# Patient Record
Sex: FEMALE | Race: WHITE | HISPANIC OR LATINO | URBAN - METROPOLITAN AREA
[De-identification: names, ages, dates, MRNs, and addresses within clinical notes are randomized per-mention and may not be internally consistent; named-entity substitution may affect disease eponyms.]

---

## 2017-06-27 PROBLEM — Z00.00 ENCOUNTER FOR PREVENTIVE HEALTH EXAMINATION: Status: ACTIVE | Noted: 2017-06-27

## 2017-09-15 ENCOUNTER — EMERGENCY (EMERGENCY)
Facility: HOSPITAL | Age: 42
LOS: 1 days | Discharge: HOME | End: 2017-09-15
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
## 2017-09-15 DIAGNOSIS — M25.572 PAIN IN LEFT ANKLE AND JOINTS OF LEFT FOOT: ICD-10-CM

## 2017-09-15 DIAGNOSIS — S93.401A SPRAIN OF UNSPECIFIED LIGAMENT OF RIGHT ANKLE, INITIAL ENCOUNTER: ICD-10-CM

## 2017-09-15 DIAGNOSIS — E11.9 TYPE 2 DIABETES MELLITUS WITHOUT COMPLICATIONS: ICD-10-CM

## 2017-09-15 DIAGNOSIS — S82.002A UNSPECIFIED FRACTURE OF LEFT PATELLA, INITIAL ENCOUNTER FOR CLOSED FRACTURE: ICD-10-CM
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
## 2018-03-24 ENCOUNTER — OUTPATIENT (OUTPATIENT)
Dept: OUTPATIENT SERVICES | Facility: HOSPITAL | Age: 43
LOS: 1 days | Discharge: HOME | End: 2018-03-24

## 2018-03-24 DIAGNOSIS — E55.9 VITAMIN D DEFICIENCY, UNSPECIFIED: ICD-10-CM

## 2018-03-24 DIAGNOSIS — Z00.00 ENCOUNTER FOR GENERAL ADULT MEDICAL EXAMINATION WITHOUT ABNORMAL FINDINGS: ICD-10-CM

## 2018-03-24 DIAGNOSIS — Z13.21 ENCOUNTER FOR SCREENING FOR NUTRITIONAL DISORDER: ICD-10-CM

## 2018-03-24 DIAGNOSIS — E11.9 TYPE 2 DIABETES MELLITUS WITHOUT COMPLICATIONS: ICD-10-CM

## 2018-03-24 DIAGNOSIS — Z20.89 CONTACT WITH AND (SUSPECTED) EXPOSURE TO OTHER COMMUNICABLE DISEASES: ICD-10-CM

## 2018-03-24 DIAGNOSIS — Z13.220 ENCOUNTER FOR SCREENING FOR LIPOID DISORDERS: ICD-10-CM

## 2018-03-24 DIAGNOSIS — Z11.3 ENCOUNTER FOR SCREENING FOR INFECTIONS WITH A PREDOMINANTLY SEXUAL MODE OF TRANSMISSION: ICD-10-CM

## 2019-02-07 ENCOUNTER — OUTPATIENT (OUTPATIENT)
Dept: OUTPATIENT SERVICES | Facility: HOSPITAL | Age: 44
LOS: 1 days | Discharge: HOME | End: 2019-02-07

## 2019-02-07 DIAGNOSIS — R79.1 ABNORMAL COAGULATION PROFILE: ICD-10-CM

## 2019-02-07 DIAGNOSIS — Z13.220 ENCOUNTER FOR SCREENING FOR LIPOID DISORDERS: ICD-10-CM

## 2019-02-07 DIAGNOSIS — N39.0 URINARY TRACT INFECTION, SITE NOT SPECIFIED: ICD-10-CM

## 2019-02-07 DIAGNOSIS — E11.9 TYPE 2 DIABETES MELLITUS WITHOUT COMPLICATIONS: ICD-10-CM

## 2019-02-07 DIAGNOSIS — R73.09 OTHER ABNORMAL GLUCOSE: ICD-10-CM

## 2019-02-07 DIAGNOSIS — R97.0 ELEVATED CARCINOEMBRYONIC ANTIGEN [CEA]: ICD-10-CM

## 2019-02-07 DIAGNOSIS — R79.89 OTHER SPECIFIED ABNORMAL FINDINGS OF BLOOD CHEMISTRY: ICD-10-CM

## 2019-02-07 DIAGNOSIS — Z13.21 ENCOUNTER FOR SCREENING FOR NUTRITIONAL DISORDER: ICD-10-CM

## 2019-02-07 DIAGNOSIS — Z00.00 ENCOUNTER FOR GENERAL ADULT MEDICAL EXAMINATION WITHOUT ABNORMAL FINDINGS: ICD-10-CM

## 2019-02-07 DIAGNOSIS — Z13.0 ENCOUNTER FOR SCREENING FOR DISEASES OF THE BLOOD AND BLOOD-FORMING ORGANS AND CERTAIN DISORDERS INVOLVING THE IMMUNE MECHANISM: ICD-10-CM

## 2019-02-07 DIAGNOSIS — R94.6 ABNORMAL RESULTS OF THYROID FUNCTION STUDIES: ICD-10-CM

## 2019-04-23 ENCOUNTER — OUTPATIENT (OUTPATIENT)
Dept: OUTPATIENT SERVICES | Facility: HOSPITAL | Age: 44
LOS: 1 days | Discharge: HOME | End: 2019-04-23

## 2019-04-23 DIAGNOSIS — Z02.1 ENCOUNTER FOR PRE-EMPLOYMENT EXAMINATION: ICD-10-CM

## 2019-04-23 DIAGNOSIS — R76.11 NONSPECIFIC REACTION TO TUBERCULIN SKIN TEST WITHOUT ACTIVE TUBERCULOSIS: ICD-10-CM

## 2019-04-23 DIAGNOSIS — D64.9 ANEMIA, UNSPECIFIED: ICD-10-CM

## 2019-04-30 ENCOUNTER — OUTPATIENT (OUTPATIENT)
Dept: OUTPATIENT SERVICES | Facility: HOSPITAL | Age: 44
LOS: 1 days | Discharge: HOME | End: 2019-04-30

## 2019-04-30 DIAGNOSIS — R76.11 NONSPECIFIC REACTION TO TUBERCULIN SKIN TEST WITHOUT ACTIVE TUBERCULOSIS: ICD-10-CM

## 2020-11-13 ENCOUNTER — OUTPATIENT (OUTPATIENT)
Dept: OUTPATIENT SERVICES | Facility: HOSPITAL | Age: 45
LOS: 1 days | Discharge: HOME | End: 2020-11-13

## 2020-11-13 DIAGNOSIS — F31.81 BIPOLAR II DISORDER: ICD-10-CM

## 2020-11-19 ENCOUNTER — APPOINTMENT (OUTPATIENT)
Dept: PSYCHIATRY | Facility: CLINIC | Age: 45
End: 2020-11-19
Payer: MEDICARE

## 2020-11-19 ENCOUNTER — OUTPATIENT (OUTPATIENT)
Dept: OUTPATIENT SERVICES | Facility: HOSPITAL | Age: 45
LOS: 1 days | Discharge: HOME | End: 2020-11-19

## 2020-11-19 ENCOUNTER — NON-APPOINTMENT (OUTPATIENT)
Age: 45
End: 2020-11-19

## 2020-11-19 DIAGNOSIS — F31.81 BIPOLAR II DISORDER: ICD-10-CM

## 2020-11-19 PROCEDURE — 90792 PSYCH DIAG EVAL W/MED SRVCS: CPT | Mod: 95

## 2020-11-25 ENCOUNTER — APPOINTMENT (OUTPATIENT)
Dept: PSYCHIATRY | Facility: CLINIC | Age: 45
End: 2020-11-25

## 2020-11-25 ENCOUNTER — OUTPATIENT (OUTPATIENT)
Dept: OUTPATIENT SERVICES | Facility: HOSPITAL | Age: 45
LOS: 1 days | Discharge: HOME | End: 2020-11-25

## 2020-11-25 DIAGNOSIS — F31.81 BIPOLAR II DISORDER: ICD-10-CM

## 2020-12-07 ENCOUNTER — OUTPATIENT (OUTPATIENT)
Dept: OUTPATIENT SERVICES | Facility: HOSPITAL | Age: 45
LOS: 1 days | Discharge: HOME | End: 2020-12-07

## 2020-12-07 ENCOUNTER — APPOINTMENT (OUTPATIENT)
Dept: PSYCHIATRY | Facility: CLINIC | Age: 45
End: 2020-12-07

## 2020-12-07 DIAGNOSIS — F31.81 BIPOLAR II DISORDER: ICD-10-CM

## 2020-12-08 ENCOUNTER — NON-APPOINTMENT (OUTPATIENT)
Age: 45
End: 2020-12-08

## 2020-12-08 DIAGNOSIS — M19.90 UNSPECIFIED OSTEOARTHRITIS, UNSPECIFIED SITE: ICD-10-CM

## 2020-12-08 DIAGNOSIS — Z87.891 PERSONAL HISTORY OF NICOTINE DEPENDENCE: ICD-10-CM

## 2020-12-08 DIAGNOSIS — F14.21 COCAINE DEPENDENCE, IN REMISSION: ICD-10-CM

## 2020-12-16 ENCOUNTER — OUTPATIENT (OUTPATIENT)
Dept: OUTPATIENT SERVICES | Facility: HOSPITAL | Age: 45
LOS: 1 days | Discharge: HOME | End: 2020-12-16

## 2020-12-16 ENCOUNTER — APPOINTMENT (OUTPATIENT)
Dept: PSYCHIATRY | Facility: CLINIC | Age: 45
End: 2020-12-16
Payer: MEDICARE

## 2020-12-16 DIAGNOSIS — F31.81 BIPOLAR II DISORDER: ICD-10-CM

## 2020-12-16 PROCEDURE — 99213 OFFICE O/P EST LOW 20 MIN: CPT | Mod: 95

## 2020-12-21 ENCOUNTER — APPOINTMENT (OUTPATIENT)
Dept: PSYCHIATRY | Facility: CLINIC | Age: 45
End: 2020-12-21

## 2020-12-21 ENCOUNTER — OUTPATIENT (OUTPATIENT)
Dept: OUTPATIENT SERVICES | Facility: HOSPITAL | Age: 45
LOS: 1 days | Discharge: HOME | End: 2020-12-21

## 2020-12-21 DIAGNOSIS — F31.81 BIPOLAR II DISORDER: ICD-10-CM

## 2021-01-04 ENCOUNTER — APPOINTMENT (OUTPATIENT)
Dept: PSYCHIATRY | Facility: CLINIC | Age: 46
End: 2021-01-04

## 2021-01-04 ENCOUNTER — OUTPATIENT (OUTPATIENT)
Dept: OUTPATIENT SERVICES | Facility: HOSPITAL | Age: 46
LOS: 1 days | Discharge: HOME | End: 2021-01-04

## 2021-01-04 DIAGNOSIS — F31.81 BIPOLAR II DISORDER: ICD-10-CM

## 2021-01-15 ENCOUNTER — OUTPATIENT (OUTPATIENT)
Dept: OUTPATIENT SERVICES | Facility: HOSPITAL | Age: 46
LOS: 1 days | Discharge: HOME | End: 2021-01-15

## 2021-01-15 ENCOUNTER — APPOINTMENT (OUTPATIENT)
Dept: PSYCHIATRY | Facility: CLINIC | Age: 46
End: 2021-01-15
Payer: MEDICARE

## 2021-01-15 DIAGNOSIS — F31.81 BIPOLAR II DISORDER: ICD-10-CM

## 2021-01-15 PROCEDURE — 99213 OFFICE O/P EST LOW 20 MIN: CPT | Mod: 95

## 2021-01-19 ENCOUNTER — APPOINTMENT (OUTPATIENT)
Dept: PSYCHIATRY | Facility: CLINIC | Age: 46
End: 2021-01-19

## 2021-01-19 ENCOUNTER — OUTPATIENT (OUTPATIENT)
Dept: OUTPATIENT SERVICES | Facility: HOSPITAL | Age: 46
LOS: 1 days | Discharge: HOME | End: 2021-01-19

## 2021-01-19 DIAGNOSIS — F31.81 BIPOLAR II DISORDER: ICD-10-CM

## 2021-02-08 ENCOUNTER — OUTPATIENT (OUTPATIENT)
Dept: OUTPATIENT SERVICES | Facility: HOSPITAL | Age: 46
LOS: 1 days | Discharge: HOME | End: 2021-02-08

## 2021-02-08 ENCOUNTER — APPOINTMENT (OUTPATIENT)
Dept: PSYCHIATRY | Facility: CLINIC | Age: 46
End: 2021-02-08

## 2021-02-08 DIAGNOSIS — F31.81 BIPOLAR II DISORDER: ICD-10-CM

## 2021-02-12 ENCOUNTER — APPOINTMENT (OUTPATIENT)
Dept: PSYCHIATRY | Facility: CLINIC | Age: 46
End: 2021-02-12
Payer: MEDICARE

## 2021-02-12 ENCOUNTER — OUTPATIENT (OUTPATIENT)
Dept: OUTPATIENT SERVICES | Facility: HOSPITAL | Age: 46
LOS: 1 days | Discharge: HOME | End: 2021-02-12

## 2021-02-12 DIAGNOSIS — F31.81 BIPOLAR II DISORDER: ICD-10-CM

## 2021-02-12 PROCEDURE — 99213 OFFICE O/P EST LOW 20 MIN: CPT | Mod: 95

## 2021-02-22 ENCOUNTER — APPOINTMENT (OUTPATIENT)
Dept: PSYCHIATRY | Facility: CLINIC | Age: 46
End: 2021-02-22

## 2021-03-01 ENCOUNTER — OUTPATIENT (OUTPATIENT)
Dept: OUTPATIENT SERVICES | Facility: HOSPITAL | Age: 46
LOS: 1 days | Discharge: HOME | End: 2021-03-01

## 2021-03-01 ENCOUNTER — APPOINTMENT (OUTPATIENT)
Dept: PSYCHIATRY | Facility: CLINIC | Age: 46
End: 2021-03-01

## 2021-03-01 DIAGNOSIS — F31.81 BIPOLAR II DISORDER: ICD-10-CM

## 2021-03-19 ENCOUNTER — APPOINTMENT (OUTPATIENT)
Dept: PSYCHIATRY | Facility: CLINIC | Age: 46
End: 2021-03-19
Payer: MEDICARE

## 2021-03-19 ENCOUNTER — OUTPATIENT (OUTPATIENT)
Dept: OUTPATIENT SERVICES | Facility: HOSPITAL | Age: 46
LOS: 1 days | Discharge: HOME | End: 2021-03-19

## 2021-03-19 DIAGNOSIS — F31.81 BIPOLAR II DISORDER: ICD-10-CM

## 2021-03-19 PROCEDURE — 99213 OFFICE O/P EST LOW 20 MIN: CPT | Mod: 95

## 2021-03-22 ENCOUNTER — OUTPATIENT (OUTPATIENT)
Dept: OUTPATIENT SERVICES | Facility: HOSPITAL | Age: 46
LOS: 1 days | Discharge: HOME | End: 2021-03-22

## 2021-03-22 ENCOUNTER — APPOINTMENT (OUTPATIENT)
Dept: PSYCHIATRY | Facility: CLINIC | Age: 46
End: 2021-03-22

## 2021-03-22 DIAGNOSIS — F31.81 BIPOLAR II DISORDER: ICD-10-CM

## 2021-04-05 ENCOUNTER — APPOINTMENT (OUTPATIENT)
Dept: PSYCHIATRY | Facility: CLINIC | Age: 46
End: 2021-04-05

## 2021-04-12 ENCOUNTER — OUTPATIENT (OUTPATIENT)
Dept: OUTPATIENT SERVICES | Facility: HOSPITAL | Age: 46
LOS: 1 days | Discharge: HOME | End: 2021-04-12

## 2021-04-12 ENCOUNTER — APPOINTMENT (OUTPATIENT)
Dept: PSYCHIATRY | Facility: CLINIC | Age: 46
End: 2021-04-12

## 2021-04-12 DIAGNOSIS — F31.81 BIPOLAR II DISORDER: ICD-10-CM

## 2021-04-20 ENCOUNTER — APPOINTMENT (OUTPATIENT)
Dept: PSYCHIATRY | Facility: CLINIC | Age: 46
End: 2021-04-20

## 2021-04-26 ENCOUNTER — APPOINTMENT (OUTPATIENT)
Dept: PSYCHIATRY | Facility: CLINIC | Age: 46
End: 2021-04-26

## 2021-04-26 ENCOUNTER — OUTPATIENT (OUTPATIENT)
Dept: OUTPATIENT SERVICES | Facility: HOSPITAL | Age: 46
LOS: 1 days | Discharge: HOME | End: 2021-04-26

## 2021-04-26 DIAGNOSIS — F31.81 BIPOLAR II DISORDER: ICD-10-CM

## 2021-05-10 ENCOUNTER — OUTPATIENT (OUTPATIENT)
Dept: OUTPATIENT SERVICES | Facility: HOSPITAL | Age: 46
LOS: 1 days | Discharge: HOME | End: 2021-05-10

## 2021-05-10 ENCOUNTER — APPOINTMENT (OUTPATIENT)
Dept: PSYCHIATRY | Facility: CLINIC | Age: 46
End: 2021-05-10
Payer: MEDICARE

## 2021-05-10 DIAGNOSIS — F31.81 BIPOLAR II DISORDER: ICD-10-CM

## 2021-05-10 PROCEDURE — 99213 OFFICE O/P EST LOW 20 MIN: CPT | Mod: 95

## 2021-05-17 ENCOUNTER — APPOINTMENT (OUTPATIENT)
Dept: PSYCHIATRY | Facility: CLINIC | Age: 46
End: 2021-05-17

## 2021-06-07 ENCOUNTER — APPOINTMENT (OUTPATIENT)
Dept: PSYCHIATRY | Facility: CLINIC | Age: 46
End: 2021-06-07

## 2021-06-07 ENCOUNTER — OUTPATIENT (OUTPATIENT)
Dept: OUTPATIENT SERVICES | Facility: HOSPITAL | Age: 46
LOS: 1 days | Discharge: HOME | End: 2021-06-07

## 2021-06-07 DIAGNOSIS — F31.81 BIPOLAR II DISORDER: ICD-10-CM

## 2021-06-21 ENCOUNTER — OUTPATIENT (OUTPATIENT)
Dept: OUTPATIENT SERVICES | Facility: HOSPITAL | Age: 46
LOS: 1 days | Discharge: HOME | End: 2021-06-21

## 2021-06-21 ENCOUNTER — APPOINTMENT (OUTPATIENT)
Dept: PSYCHIATRY | Facility: CLINIC | Age: 46
End: 2021-06-21

## 2021-06-21 DIAGNOSIS — F31.81 BIPOLAR II DISORDER: ICD-10-CM

## 2021-07-02 ENCOUNTER — APPOINTMENT (OUTPATIENT)
Dept: PSYCHIATRY | Facility: CLINIC | Age: 46
End: 2021-07-02
Payer: MEDICARE

## 2021-07-02 ENCOUNTER — OUTPATIENT (OUTPATIENT)
Dept: OUTPATIENT SERVICES | Facility: HOSPITAL | Age: 46
LOS: 1 days | Discharge: HOME | End: 2021-07-02

## 2021-07-02 DIAGNOSIS — F31.81 BIPOLAR II DISORDER: ICD-10-CM

## 2021-07-02 PROCEDURE — 99213 OFFICE O/P EST LOW 20 MIN: CPT | Mod: 95

## 2021-07-06 ENCOUNTER — APPOINTMENT (OUTPATIENT)
Dept: PSYCHIATRY | Facility: CLINIC | Age: 46
End: 2021-07-06

## 2021-07-23 ENCOUNTER — APPOINTMENT (OUTPATIENT)
Dept: PSYCHIATRY | Facility: CLINIC | Age: 46
End: 2021-07-23

## 2021-07-23 ENCOUNTER — OUTPATIENT (OUTPATIENT)
Dept: OUTPATIENT SERVICES | Facility: HOSPITAL | Age: 46
LOS: 1 days | Discharge: HOME | End: 2021-07-23

## 2021-07-23 DIAGNOSIS — F31.81 BIPOLAR II DISORDER: ICD-10-CM

## 2021-08-09 ENCOUNTER — APPOINTMENT (OUTPATIENT)
Dept: PSYCHIATRY | Facility: CLINIC | Age: 46
End: 2021-08-09

## 2021-08-09 ENCOUNTER — OUTPATIENT (OUTPATIENT)
Dept: OUTPATIENT SERVICES | Facility: HOSPITAL | Age: 46
LOS: 1 days | Discharge: HOME | End: 2021-08-09

## 2021-08-09 DIAGNOSIS — F31.81 BIPOLAR II DISORDER: ICD-10-CM

## 2021-08-17 ENCOUNTER — APPOINTMENT (OUTPATIENT)
Dept: PSYCHIATRY | Facility: CLINIC | Age: 46
End: 2021-08-17

## 2021-08-24 ENCOUNTER — OUTPATIENT (OUTPATIENT)
Dept: OUTPATIENT SERVICES | Facility: HOSPITAL | Age: 46
LOS: 1 days | Discharge: HOME | End: 2021-08-24

## 2021-08-24 ENCOUNTER — APPOINTMENT (OUTPATIENT)
Dept: PSYCHIATRY | Facility: CLINIC | Age: 46
End: 2021-08-24
Payer: MEDICARE

## 2021-08-24 DIAGNOSIS — F31.81 BIPOLAR II DISORDER: ICD-10-CM

## 2021-08-24 PROCEDURE — 99213 OFFICE O/P EST LOW 20 MIN: CPT | Mod: 95

## 2021-09-13 ENCOUNTER — APPOINTMENT (OUTPATIENT)
Dept: PSYCHIATRY | Facility: CLINIC | Age: 46
End: 2021-09-13

## 2021-09-13 ENCOUNTER — OUTPATIENT (OUTPATIENT)
Dept: OUTPATIENT SERVICES | Facility: HOSPITAL | Age: 46
LOS: 1 days | Discharge: HOME | End: 2021-09-13

## 2021-09-13 DIAGNOSIS — F31.81 BIPOLAR II DISORDER: ICD-10-CM

## 2021-09-21 ENCOUNTER — APPOINTMENT (OUTPATIENT)
Dept: PSYCHIATRY | Facility: CLINIC | Age: 46
End: 2021-09-21
Payer: MEDICARE

## 2021-09-21 ENCOUNTER — OUTPATIENT (OUTPATIENT)
Dept: OUTPATIENT SERVICES | Facility: HOSPITAL | Age: 46
LOS: 1 days | Discharge: HOME | End: 2021-09-21

## 2021-09-21 PROCEDURE — 99213 OFFICE O/P EST LOW 20 MIN: CPT | Mod: 95

## 2021-09-22 DIAGNOSIS — F31.81 BIPOLAR II DISORDER: ICD-10-CM

## 2021-10-04 ENCOUNTER — OUTPATIENT (OUTPATIENT)
Dept: OUTPATIENT SERVICES | Facility: HOSPITAL | Age: 46
LOS: 1 days | Discharge: HOME | End: 2021-10-04

## 2021-10-04 ENCOUNTER — APPOINTMENT (OUTPATIENT)
Dept: PSYCHIATRY | Facility: CLINIC | Age: 46
End: 2021-10-04

## 2021-10-04 DIAGNOSIS — F31.81 BIPOLAR II DISORDER: ICD-10-CM

## 2021-10-05 ENCOUNTER — EMERGENCY (EMERGENCY)
Facility: HOSPITAL | Age: 46
LOS: 0 days | Discharge: HOME | End: 2021-10-05
Attending: EMERGENCY MEDICINE | Admitting: EMERGENCY MEDICINE
Payer: MEDICARE

## 2021-10-05 VITALS
HEIGHT: 65.5 IN | SYSTOLIC BLOOD PRESSURE: 128 MMHG | OXYGEN SATURATION: 98 % | TEMPERATURE: 98 F | HEART RATE: 77 BPM | RESPIRATION RATE: 18 BRPM | DIASTOLIC BLOOD PRESSURE: 62 MMHG | WEIGHT: 207.9 LBS

## 2021-10-05 DIAGNOSIS — B85.3 PHTHIRIASIS: ICD-10-CM

## 2021-10-05 DIAGNOSIS — R10.2 PELVIC AND PERINEAL PAIN: ICD-10-CM

## 2021-10-05 DIAGNOSIS — B85.0 PEDICULOSIS DUE TO PEDICULUS HUMANUS CAPITIS: ICD-10-CM

## 2021-10-05 PROCEDURE — 99282 EMERGENCY DEPT VISIT SF MDM: CPT

## 2021-10-05 RX ORDER — PERMETHRIN CREAM 5% W/W 50 MG/G
1 CREAM TOPICAL
Qty: 60 | Refills: 0
Start: 2021-10-05 | End: 2021-10-05

## 2021-10-05 NOTE — ED PROVIDER NOTE - PATIENT PORTAL LINK FT
You can access the FollowMyHealth Patient Portal offered by [facility] by registering at the following website: [email or web address]. By joining [employer]’s FollowMyHealth portal, you will also be able to view your health information using other applications (apps) compatible with our system.

## 2021-10-05 NOTE — ED ADULT NURSE NOTE - NSIMPLEMENTINTERV_GEN_ALL_ED
Implemented All Universal Safety Interventions:  [place]cy to call system. Call bell, personal items and telephone within reach. Instruct patient to call for assistance. Room bathroom lighting operational. Non-slip footwear when patient is off stretcher. Physically safe environment: no spills, clutter or unnecessary equipment. Stretcher in lowest position, wheels locked, appropriate side rails in place.

## 2021-10-05 NOTE — ED PROVIDER NOTE - OBJECTIVE STATEMENT
Patient is a 45 yo female with no sig PMHx c/o itching on head and pelvic area x 5 days. Patient had a cat that was infected with fleas or lice, had to give cat away, had disinfected her whole house and washed everything, but 5 days ago, started with itching on her hair and pelvic area, thinks it may be the fleas or lice. Denies fever, chills, chest pain, SOB, cough, abdominal pain, n/v/d.

## 2021-10-05 NOTE — ED PROVIDER NOTE - PHYSICAL EXAMINATION
CONSTITUTIONAL: Well-developed; well-nourished; in no acute distress.   SKIN: warm, dry  HEAD: Normocephalic; atraumatic. Hair with small nits/lice.  EYES: no conjunctival injection. PERRL.   ENT: No nasal discharge; airway clear.  NECK: Supple; non tender.  CARD: S1, S2 normal; no murmurs, gallops, or rubs. Regular rate and rhythm.   RESP: No wheezes, rales or rhonchi.  : Sparse pubic hair, no redness or swelling on vulva.  EXT: Normal ROM.  No clubbing, cyanosis or edema.   LYMPH: No acute cervical adenopathy.  NEURO: Alert, oriented, grossly unremarkable  PSYCH: Cooperative, appropriate.

## 2021-10-05 NOTE — ED PROVIDER NOTE - CLINICAL SUMMARY MEDICAL DECISION MAKING FREE TEXT BOX
pt presenting with itching to scalp and pubic hairs after her pet recently had fleas. tried otc shampoos. +nits to scalp/hair and pubic hairs. Comfortable with discharge and follow-up outpatient, strict return precautions given. Endorses understanding of all of this and aware that they can return at any time for new or concerning symptoms. No further questions or concerns at this time

## 2021-10-05 NOTE — ED PROVIDER NOTE - CARE PROVIDER_API CALL
Rosalva Stover  Internal Medicine  [address] [place] [address]  Phone: [phone]  Fax: [phone]  Follow Up Time: 4-6 Days

## 2021-10-05 NOTE — ED PROVIDER NOTE - NSFOLLOWUPINSTRUCTIONS_ED_ALL_ED_FT
Permethrin instructions    Head lice: Topical: Cream rinse/lotion 1%: Prior to application, wash hair with conditioner-free shampoo; rinse with water and towel dry. Apply a sufficient amount of lotion or cream rinse to saturate the hair and scalp (especially behind the ears and nape of neck). Leave on hair for 10 minutes (but no longer), then rinse off with warm water; remove remaining nits with nit comb. A single application is generally sufficient; however, may repeat 7 days after first treatment if lice or nits are still present.    Pubic lice (off-label use): Topical: Cream rinse 1%: Apply to affected areas and wash off after 10 minutes.      Pediculosis    WHAT YOU NEED TO KNOW:    Pediculosis is a lice infestation of the hairy areas on the body. Lice are tiny bugs that bite into the skin and suck blood to live and grow. The most common areas of infestation are the scalp or genitals. Eyebrows, eyelashes, chest hair, or underarm hair may also be infested.    DISCHARGE INSTRUCTIONS:    Self-care:   •Comb your hair to remove all nits. Lice medicine may not kill or remove all the nits. Use your fingernails or a fine-toothed comb to remove dead or dying lice and nits that are stuck to your hair. Nit estrada come in some lice treatment packages. To make it easier to comb out nits, soak your hair with a solution of ½ white vinegar and ½ water. Cover your hair with a bathing cap or towel for 30 minutes. Then remove the cap or towel and comb from the scalp outward. You may also use a gel that loosens nits. This may be bought over-the-counter.      •Clean clothes and bedding. Clean all items that you have used since 2 days before you learned you had lice. Wash items like sheets, clothes, and towels using the hot water cycle. Dry on the hot cycle for at least 20 minutes. Dry clean items that cannot be washed in a washing machine. You can also hang these items outside for 2 days. Or, you can put them into a closed plastic bag for 2 weeks if you have head lice. Keep items in a closed plastic bag for 4 weeks if you have body lice or pubic lice.      •Disinfect all hair items. Soak estrada, brushes, and all hair items in rubbing alcohol, an antiseptic, or anti-lice shampoo for at least 1 hour. You may also put them in boiling water for at least 10 minutes.      •Vacuum carpets, rugs, car seats, and furniture.      •Tell anyone who has been close to you to be checked for lice. This includes friends, classmates, family, or sex partners.       •Do not share personal items, such as estrada and brushes, clothes, and hats.      Medicines:   •Medical shampoos, creams, or lotions will kill the lice. They may be prescription or over-the-counter. Ask your healthcare provider for help choosing the right lice medicine. Do not use lice medicine on children younger than 2 years old. Instead, use regular shampoo and pick the nits or lice off the scalp and hair. Never use gasoline, kerosene, or other oil products to treat lice.       •Take your medicine as directed. Contact your healthcare provider if you think your medicine is not helping or if you have side effects. Tell him or her if you are allergic to any medicine. Keep a list of the medicines, vitamins, and herbs you take. Include the amounts, and when and why you take them. Bring the list or the pill bottles to follow-up visits. Carry your medicine list with you in case of an emergency.      Return to work or school: You can return to work or school after you complete treatment, even if you still have nits. If your child has lice, tell his school or  center.     Follow up with your healthcare provider as directed: Write down your questions so you remember to ask them during your visits.    Contact your healthcare provider if:   •The lice bites become crusty or fill with pus.      •You have matted, foul-smelling scalp and hair.      •You see live lice or new nits more than 2 days after you use lice medicine.      •You have trouble sleeping due to itching      •You have questions or concerns about your condition or care.    Please follow up with your primary care doctor within 1 week.

## 2021-10-05 NOTE — ED PROVIDER NOTE - NS ED ROS FT
Review of Systems:  •	CONSTITUTIONAL - No fever, No diaphoresis, No weight change  •	SKIN - No rash, + itchy scalp and pelvic area  •	HEMATOLOGIC - No abnormal bleeding or bruising  •	EYES - No eye pain, No blurred vision  •	ENT - No change in hearing, No sore throat, No neck pain, No rhinorrhea, No ear pain  •	RESPIRATORY - No shortness of breath, No cough  •	CARDIAC -No chest pain, No palpitations  •	GI - No abdominal pain, No nausea, No vomiting, No diarrhea, No constipation, No bright red blood per rectum or melena. No flank pain  •                 - No dysuria, frequency, hematuria.   •	ENDO - No polydypsia, No polyuria, No heat/cold intolerance  •	MUSCULOSKELETAL - No joint paint, No swelling, No back pain  •	NEUROLOGIC - No numbness, No focal weakness, No headache, No dizziness  All other systems negative, unless specified in HPI

## 2021-10-19 PROBLEM — Z78.9 OTHER SPECIFIED HEALTH STATUS: Chronic | Status: ACTIVE | Noted: 2021-10-05

## 2021-11-05 ENCOUNTER — APPOINTMENT (OUTPATIENT)
Dept: PSYCHIATRY | Facility: CLINIC | Age: 46
End: 2021-11-05
Payer: MEDICARE

## 2021-11-05 ENCOUNTER — OUTPATIENT (OUTPATIENT)
Dept: OUTPATIENT SERVICES | Facility: HOSPITAL | Age: 46
LOS: 1 days | Discharge: HOME | End: 2021-11-05

## 2021-11-05 DIAGNOSIS — F31.81 BIPOLAR II DISORDER: ICD-10-CM

## 2021-11-05 PROCEDURE — 99213 OFFICE O/P EST LOW 20 MIN: CPT | Mod: 95

## 2021-11-19 ENCOUNTER — APPOINTMENT (OUTPATIENT)
Dept: PSYCHIATRY | Facility: CLINIC | Age: 46
End: 2021-11-19
Payer: MEDICARE

## 2021-11-19 ENCOUNTER — OUTPATIENT (OUTPATIENT)
Dept: OUTPATIENT SERVICES | Facility: HOSPITAL | Age: 46
LOS: 1 days | Discharge: HOME | End: 2021-11-19

## 2021-11-19 DIAGNOSIS — F31.81 BIPOLAR II DISORDER: ICD-10-CM

## 2021-11-19 PROCEDURE — 99213 OFFICE O/P EST LOW 20 MIN: CPT | Mod: 95

## 2021-12-10 ENCOUNTER — APPOINTMENT (OUTPATIENT)
Dept: PSYCHIATRY | Facility: CLINIC | Age: 46
End: 2021-12-10

## 2022-01-03 ENCOUNTER — APPOINTMENT (OUTPATIENT)
Dept: PSYCHIATRY | Facility: CLINIC | Age: 47
End: 2022-01-03
Payer: MEDICARE

## 2022-01-03 ENCOUNTER — OUTPATIENT (OUTPATIENT)
Dept: OUTPATIENT SERVICES | Facility: HOSPITAL | Age: 47
LOS: 1 days | Discharge: HOME | End: 2022-01-03

## 2022-01-03 PROCEDURE — 99213 OFFICE O/P EST LOW 20 MIN: CPT | Mod: 95

## 2022-01-04 DIAGNOSIS — F41.1 GENERALIZED ANXIETY DISORDER: ICD-10-CM

## 2022-01-04 DIAGNOSIS — F31.81 BIPOLAR II DISORDER: ICD-10-CM

## 2022-02-14 ENCOUNTER — APPOINTMENT (OUTPATIENT)
Dept: PSYCHIATRY | Facility: CLINIC | Age: 47
End: 2022-02-14
Payer: MEDICARE

## 2022-02-14 ENCOUNTER — OUTPATIENT (OUTPATIENT)
Dept: OUTPATIENT SERVICES | Facility: HOSPITAL | Age: 47
LOS: 1 days | Discharge: HOME | End: 2022-02-14

## 2022-02-14 DIAGNOSIS — F31.81 BIPOLAR II DISORDER: ICD-10-CM

## 2022-02-14 PROCEDURE — 99213 OFFICE O/P EST LOW 20 MIN: CPT | Mod: 95

## 2022-03-11 ENCOUNTER — NON-APPOINTMENT (OUTPATIENT)
Age: 47
End: 2022-03-11

## 2022-03-18 ENCOUNTER — NON-APPOINTMENT (OUTPATIENT)
Age: 47
End: 2022-03-18

## 2022-03-23 ENCOUNTER — APPOINTMENT (OUTPATIENT)
Dept: PSYCHIATRY | Facility: CLINIC | Age: 47
End: 2022-03-23

## 2022-03-31 ENCOUNTER — APPOINTMENT (OUTPATIENT)
Dept: PSYCHIATRY | Facility: CLINIC | Age: 47
End: 2022-03-31
Payer: MEDICARE

## 2022-03-31 ENCOUNTER — OUTPATIENT (OUTPATIENT)
Dept: OUTPATIENT SERVICES | Facility: HOSPITAL | Age: 47
LOS: 1 days | Discharge: HOME | End: 2022-03-31

## 2022-03-31 DIAGNOSIS — F31.81 BIPOLAR II DISORDER: ICD-10-CM

## 2022-03-31 PROCEDURE — 99213 OFFICE O/P EST LOW 20 MIN: CPT | Mod: 95

## 2022-04-05 ENCOUNTER — APPOINTMENT (OUTPATIENT)
Dept: PSYCHIATRY | Facility: CLINIC | Age: 47
End: 2022-04-05

## 2022-04-26 ENCOUNTER — APPOINTMENT (OUTPATIENT)
Dept: PSYCHIATRY | Facility: CLINIC | Age: 47
End: 2022-04-26

## 2022-04-28 ENCOUNTER — APPOINTMENT (OUTPATIENT)
Dept: PSYCHIATRY | Facility: CLINIC | Age: 47
End: 2022-04-28

## 2022-04-28 ENCOUNTER — NON-APPOINTMENT (OUTPATIENT)
Age: 47
End: 2022-04-28

## 2022-05-20 ENCOUNTER — APPOINTMENT (OUTPATIENT)
Dept: PSYCHIATRY | Facility: CLINIC | Age: 47
End: 2022-05-20
Payer: MEDICARE

## 2022-05-20 ENCOUNTER — OUTPATIENT (OUTPATIENT)
Dept: OUTPATIENT SERVICES | Facility: HOSPITAL | Age: 47
LOS: 1 days | Discharge: HOME | End: 2022-05-20

## 2022-05-20 DIAGNOSIS — F31.81 BIPOLAR II DISORDER: ICD-10-CM

## 2022-05-20 PROCEDURE — 99442: CPT | Mod: 95

## 2022-05-23 ENCOUNTER — EMERGENCY (EMERGENCY)
Facility: HOSPITAL | Age: 47
LOS: 0 days | Discharge: HOME | End: 2022-05-23
Attending: EMERGENCY MEDICINE | Admitting: EMERGENCY MEDICINE
Payer: MEDICARE

## 2022-05-23 VITALS
RESPIRATION RATE: 18 BRPM | WEIGHT: 210.98 LBS | TEMPERATURE: 96 F | OXYGEN SATURATION: 96 % | SYSTOLIC BLOOD PRESSURE: 118 MMHG | HEART RATE: 80 BPM | DIASTOLIC BLOOD PRESSURE: 90 MMHG | HEIGHT: 65.5 IN

## 2022-05-23 DIAGNOSIS — L29.8 OTHER PRURITUS: ICD-10-CM

## 2022-05-23 LAB
ALBUMIN SERPL ELPH-MCNC: 4.2 G/DL — SIGNIFICANT CHANGE UP (ref 3.5–5.2)
ALP SERPL-CCNC: 85 U/L — SIGNIFICANT CHANGE UP (ref 30–115)
ALT FLD-CCNC: 14 U/L — SIGNIFICANT CHANGE UP (ref 0–41)
ANION GAP SERPL CALC-SCNC: 13 MMOL/L — SIGNIFICANT CHANGE UP (ref 7–14)
AST SERPL-CCNC: 17 U/L — SIGNIFICANT CHANGE UP (ref 0–41)
BASOPHILS # BLD AUTO: 0.09 K/UL — SIGNIFICANT CHANGE UP (ref 0–0.2)
BASOPHILS NFR BLD AUTO: 0.8 % — SIGNIFICANT CHANGE UP (ref 0–1)
BILIRUB SERPL-MCNC: 0.2 MG/DL — SIGNIFICANT CHANGE UP (ref 0.2–1.2)
BUN SERPL-MCNC: 10 MG/DL — SIGNIFICANT CHANGE UP (ref 10–20)
CALCIUM SERPL-MCNC: 9.4 MG/DL — SIGNIFICANT CHANGE UP (ref 8.5–10.1)
CHLORIDE SERPL-SCNC: 104 MMOL/L — SIGNIFICANT CHANGE UP (ref 98–110)
CO2 SERPL-SCNC: 21 MMOL/L — SIGNIFICANT CHANGE UP (ref 17–32)
CREAT SERPL-MCNC: 0.6 MG/DL — LOW (ref 0.7–1.5)
EGFR: 111 ML/MIN/1.73M2 — SIGNIFICANT CHANGE UP
EOSINOPHIL # BLD AUTO: 0.22 K/UL — SIGNIFICANT CHANGE UP (ref 0–0.7)
EOSINOPHIL NFR BLD AUTO: 2 % — SIGNIFICANT CHANGE UP (ref 0–8)
GLUCOSE SERPL-MCNC: 78 MG/DL — SIGNIFICANT CHANGE UP (ref 70–99)
HCT VFR BLD CALC: 45 % — SIGNIFICANT CHANGE UP (ref 37–47)
HGB BLD-MCNC: 15.3 G/DL — SIGNIFICANT CHANGE UP (ref 12–16)
IMM GRANULOCYTES NFR BLD AUTO: 0.3 % — SIGNIFICANT CHANGE UP (ref 0.1–0.3)
LYMPHOCYTES # BLD AUTO: 3.74 K/UL — HIGH (ref 1.2–3.4)
LYMPHOCYTES # BLD AUTO: 33.9 % — SIGNIFICANT CHANGE UP (ref 20.5–51.1)
MCHC RBC-ENTMCNC: 27.4 PG — SIGNIFICANT CHANGE UP (ref 27–31)
MCHC RBC-ENTMCNC: 34 G/DL — SIGNIFICANT CHANGE UP (ref 32–37)
MCV RBC AUTO: 80.6 FL — LOW (ref 81–99)
MONOCYTES # BLD AUTO: 0.71 K/UL — HIGH (ref 0.1–0.6)
MONOCYTES NFR BLD AUTO: 6.4 % — SIGNIFICANT CHANGE UP (ref 1.7–9.3)
NEUTROPHILS # BLD AUTO: 6.24 K/UL — SIGNIFICANT CHANGE UP (ref 1.4–6.5)
NEUTROPHILS NFR BLD AUTO: 56.6 % — SIGNIFICANT CHANGE UP (ref 42.2–75.2)
NRBC # BLD: 0 /100 WBCS — SIGNIFICANT CHANGE UP (ref 0–0)
PLATELET # BLD AUTO: 289 K/UL — SIGNIFICANT CHANGE UP (ref 130–400)
POTASSIUM SERPL-MCNC: 4.1 MMOL/L — SIGNIFICANT CHANGE UP (ref 3.5–5)
POTASSIUM SERPL-SCNC: 4.1 MMOL/L — SIGNIFICANT CHANGE UP (ref 3.5–5)
PROT SERPL-MCNC: 7.5 G/DL — SIGNIFICANT CHANGE UP (ref 6–8)
RBC # BLD: 5.58 M/UL — HIGH (ref 4.2–5.4)
RBC # FLD: 12.7 % — SIGNIFICANT CHANGE UP (ref 11.5–14.5)
SODIUM SERPL-SCNC: 138 MMOL/L — SIGNIFICANT CHANGE UP (ref 135–146)
WBC # BLD: 11.03 K/UL — HIGH (ref 4.8–10.8)
WBC # FLD AUTO: 11.03 K/UL — HIGH (ref 4.8–10.8)

## 2022-05-23 PROCEDURE — 99284 EMERGENCY DEPT VISIT MOD MDM: CPT | Mod: FS

## 2022-05-23 NOTE — ED PROVIDER NOTE - OBJECTIVE STATEMENT
46-year-old female no past medical history presents to the ED complaining of itchy scalp worse at night for the last 5 months. Previously treated for head lice with no improvement. no fever no chills

## 2022-05-23 NOTE — ED PROVIDER NOTE - ATTENDING APP SHARED VISIT CONTRIBUTION OF CARE
46F no pmh p/w 5 months of itchy scalp. requesting labs to check liver and kidneys. no rash. no fever. treated for lice months ago. has tried many different scalp treatments and shampoos. has not seen dermatology.     on exam, AFVSS, well janett nad, ncat, eomi, perrla, mmm, lctab, scalp appears clean, no rash, no bugs or bites, aaox3, no focal deficits, no le edema or calf ttp,     a/p; scalp itching x months, labs unremarkable, dc home w f/u derm 1-2 weeks, strict return precautions

## 2022-05-23 NOTE — ED PROVIDER NOTE - PATIENT PORTAL LINK FT
You can access the FollowMyHealth Patient Portal offered by [facility] by registering at the following website: [email or web address]. By joining [employer]’s FollowMyHealth portal, you will also be able to view your health information using other applications (apps) compatible with our system.

## 2022-05-23 NOTE — ED PROVIDER NOTE - CARE PROVIDER_API CALL
Rock Mcconnell)  Dermatology; Internal Medicine  [address] [place] [address]  Phone: [phone]  Fax: [phone]  Follow Up Time:

## 2022-05-23 NOTE — ED PROVIDER NOTE - CLINICAL SUMMARY MEDICAL DECISION MAKING FREE TEXT BOX
a/p; scalp itching x months, labs unremarkable, dc home w f/u derm 1-2 weeks, strict return precautions

## 2022-06-08 ENCOUNTER — OUTPATIENT (OUTPATIENT)
Dept: OUTPATIENT SERVICES | Facility: HOSPITAL | Age: 47
LOS: 1 days | Discharge: HOME | End: 2022-06-08

## 2022-06-08 ENCOUNTER — APPOINTMENT (OUTPATIENT)
Dept: PSYCHIATRY | Facility: CLINIC | Age: 47
End: 2022-06-08

## 2022-06-09 DIAGNOSIS — F31.81 BIPOLAR II DISORDER: ICD-10-CM

## 2022-06-28 ENCOUNTER — APPOINTMENT (OUTPATIENT)
Dept: PSYCHIATRY | Facility: CLINIC | Age: 47
End: 2022-06-28

## 2022-07-06 ENCOUNTER — OUTPATIENT (OUTPATIENT)
Dept: OUTPATIENT SERVICES | Facility: HOSPITAL | Age: 47
LOS: 1 days | Discharge: HOME | End: 2022-07-06

## 2022-07-06 ENCOUNTER — APPOINTMENT (OUTPATIENT)
Dept: PSYCHIATRY | Facility: CLINIC | Age: 47
End: 2022-07-06

## 2022-07-06 DIAGNOSIS — F31.81 BIPOLAR II DISORDER: ICD-10-CM

## 2022-07-07 ENCOUNTER — OUTPATIENT (OUTPATIENT)
Dept: OUTPATIENT SERVICES | Facility: HOSPITAL | Age: 47
LOS: 1 days | Discharge: HOME | End: 2022-07-07

## 2022-07-07 DIAGNOSIS — K02.63 DENTAL CARIES ON SMOOTH SURFACE PENETRATING INTO PULP: ICD-10-CM

## 2022-07-08 ENCOUNTER — APPOINTMENT (OUTPATIENT)
Dept: PSYCHIATRY | Facility: CLINIC | Age: 47
End: 2022-07-08

## 2022-07-08 ENCOUNTER — OUTPATIENT (OUTPATIENT)
Dept: OUTPATIENT SERVICES | Facility: HOSPITAL | Age: 47
LOS: 1 days | Discharge: HOME | End: 2022-07-08

## 2022-07-08 DIAGNOSIS — F31.81 BIPOLAR II DISORDER: ICD-10-CM

## 2022-07-08 PROCEDURE — 99213 OFFICE O/P EST LOW 20 MIN: CPT

## 2022-07-21 ENCOUNTER — APPOINTMENT (OUTPATIENT)
Dept: PSYCHIATRY | Facility: CLINIC | Age: 47
End: 2022-07-21

## 2022-08-09 ENCOUNTER — OUTPATIENT (OUTPATIENT)
Dept: OUTPATIENT SERVICES | Facility: HOSPITAL | Age: 47
LOS: 1 days | Discharge: HOME | End: 2022-08-09

## 2022-08-09 ENCOUNTER — APPOINTMENT (OUTPATIENT)
Dept: PSYCHIATRY | Facility: CLINIC | Age: 47
End: 2022-08-09

## 2022-08-09 DIAGNOSIS — F31.81 BIPOLAR II DISORDER: ICD-10-CM

## 2022-09-01 ENCOUNTER — APPOINTMENT (OUTPATIENT)
Dept: PSYCHIATRY | Facility: CLINIC | Age: 47
End: 2022-09-01

## 2022-09-02 ENCOUNTER — OUTPATIENT (OUTPATIENT)
Dept: OUTPATIENT SERVICES | Facility: HOSPITAL | Age: 47
LOS: 1 days | Discharge: HOME | End: 2022-09-02

## 2022-09-02 ENCOUNTER — APPOINTMENT (OUTPATIENT)
Dept: PSYCHIATRY | Facility: CLINIC | Age: 47
End: 2022-09-02

## 2022-09-02 DIAGNOSIS — F31.81 BIPOLAR II DISORDER: ICD-10-CM

## 2022-09-02 PROCEDURE — 99214 OFFICE O/P EST MOD 30 MIN: CPT | Mod: 95

## 2022-09-15 ENCOUNTER — APPOINTMENT (OUTPATIENT)
Dept: PSYCHIATRY | Facility: CLINIC | Age: 47
End: 2022-09-15

## 2022-09-22 ENCOUNTER — NON-APPOINTMENT (OUTPATIENT)
Age: 47
End: 2022-09-22

## 2022-10-06 ENCOUNTER — NON-APPOINTMENT (OUTPATIENT)
Age: 47
End: 2022-10-06

## 2022-10-13 ENCOUNTER — OUTPATIENT (OUTPATIENT)
Dept: OUTPATIENT SERVICES | Facility: HOSPITAL | Age: 47
LOS: 1 days | Discharge: HOME | End: 2022-10-13

## 2022-10-13 ENCOUNTER — APPOINTMENT (OUTPATIENT)
Dept: PSYCHIATRY | Facility: CLINIC | Age: 47
End: 2022-10-13

## 2022-10-13 DIAGNOSIS — F31.81 BIPOLAR II DISORDER: ICD-10-CM

## 2022-10-14 ENCOUNTER — OUTPATIENT (OUTPATIENT)
Dept: OUTPATIENT SERVICES | Facility: HOSPITAL | Age: 47
LOS: 1 days | Discharge: HOME | End: 2022-10-14

## 2022-10-14 ENCOUNTER — APPOINTMENT (OUTPATIENT)
Dept: PSYCHIATRY | Facility: CLINIC | Age: 47
End: 2022-10-14

## 2022-10-14 DIAGNOSIS — F31.81 BIPOLAR II DISORDER: ICD-10-CM

## 2022-10-14 PROCEDURE — 99213 OFFICE O/P EST LOW 20 MIN: CPT

## 2022-10-14 NOTE — BEHAVIORAL HEALTH
[Return in ____ week(s)] : Return in [unfilled] week(s) [FreeTextEntry2] : Goal#1 Reduce Symptoms of Bipolar Disorder\par Goal #1 Objective #1 Pt will explore and process feelings, identifying two triggers of mood episodes\par Goal#1 Objective #2 Pt will learn and implement two coping skills in order to reduce symptoms of bipolar disorder\par Goal #1 Objective #3 Pt will maintain monthly med management appointments.  \par \par \par  [de-identified] : The focus of the session was on pt recent mood change. Therapist provided active listening as pt shared how she has days with extreme bursts of energy fo,lowed by days where she feels completely drained of energy. This occurs about 1x a week and the energized days begin with a feeling of euphoria.. Pt reports in addition her mood has kept her from attending her NA meetings and from getting out of the house, Therapist validated pt experience and explored any triggers for recent mood changes. Pt worries about her son who was moved a different person 6 hours a way and she has been unable to visit him as one stressor. Pt is able to recognize initial onset of elevated mood and was advised to use the medication  prescribed  for her at these moments. Pt is often apprehensive to take meds due to hx of addiction despite being  reassured of medication safety. Pt will,try the medicine as well as other coping skills reviewed in session such as reaching out to sponsor, doing her yoga tapes and getting out of the house. 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 Pt was responsive to therapist support and interventions. [FreeTextEntry1] : Pt will adhere to all safety precautions related to COVID and will contact tx team for worsening of symptoms and/or problems with medication. Next appt: 11/1\par

## 2022-10-14 NOTE — REASON FOR VISIT
[Home] : at home, [unfilled] , at the time of the visit. [Medical Office: ([facility])___] : at the medical office located in  [Patient] : the patient [FreeTextEntry1] : Bipolar d/o

## 2022-10-28 NOTE — PAST MEDICAL HISTORY
[FreeTextEntry1] : Long history of Bipolar 2 disorder and remote history of Cocaine Dependence , in recovery for many years, actively participating with NA meetings\par  No histoyr of IPPs or SAs

## 2022-10-28 NOTE — REASON FOR VISIT
[Patient] : Patient [Follow-Up Visit] : a follow-up visit [FreeTextEntry1] : follow up for maintenance treatment of bipolar disorder  [Home] : at home, [unfilled] , at the time of the visit. [Medical Office: ([facility])___] : at the medical office located in  [Verbal consent obtained from patient] : the patient, [unfilled]

## 2022-10-28 NOTE — ASSESSMENT
[FreeTextEntry1] : PAtient is 45 years old,  , English speaking, , unemployed women, living alone, with history of Bipolar 2 disorder\par  and past remote history of  Cocaine dependence ( in sustained remission) who   seen today for maintenance treatment of Bipolar disorder. \taylor   Presented today  in euthymic mood, on her usual baseline of symptoms and functioning , stated resolution of excessive energy and anxiety , no recent episodes of racing thoughts and mostly satisfactory sleep.  No signs of clinical depression were elicited , .   Enedelia remains insightful and future oriented and being able to care for herself  , has no changes in daily functioning,  without S/h ideations . Compliant with her meds.\par  Will continue the same doses of Abilify and Ambien ( doesn’t want to increase the dose of Abilify) will add Hyrroxyzine 25- 50 mg for anxiety/ onset insomnia , \par    NExt follow up in 2 weeks to monitor more closely , \par  Continue individual therapy

## 2022-10-28 NOTE — CURRENT PSYCHIATRIC SYMPTOMS
[de-identified] : current mood is euthymic  [de-identified] : no signs of hypomania  [de-identified] : no evidence of psychosis  [de-identified] : well controlled

## 2022-10-28 NOTE — CURRENT PSYCHIATRIC SYMPTOMS
[de-identified] : current mood is euthymic  [de-identified] : no signs of hypomania  [de-identified] : no evidence of psychosis  [de-identified] : well controlled

## 2022-10-28 NOTE — SOCIAL HISTORY
[Alone] : lives alone [Disabled] : disabled [] :  [High School] : high school [FreeTextEntry1] : divocred twice  [Yes] : yes [FreeTextEntry5] : history  of Cocaine dependence , in recovery for several years  [FreeTextEntry6] : NA meetings

## 2022-10-28 NOTE — HISTORY OF PRESENT ILLNESS
[FreeTextEntry1] :  Enedelia was evaluated  in person today , She presented being well groomed with open and forthcoming , as usually. She reported that she is feeling ' good, her usual self' and reported that her anxiety and sense of having excessive emotions and energy subsided and  episodes of racing thoughts as well. 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      No other changes or triggers were elicited. Enedelia denied other manic symptoms and there is no signs of clinical. Enedelia  has an insight into her symptoms and vigilant about relapse.   Enedelia denied any s/h ideations, no psychotic features  elicited. . Tolerating her medications without SE [de-identified] :  Enedelia was evaluated  in person today , She presented being well groomed with open and forthcoming attitude , as usually. She , however, shared  that she has been feeling more anxious inside with her thoughts becoming somewhat racing She admitted that she is still sleeping okay most of the night but having difficult time to fall asleep. She reported no other stressors , except worsening of her mothers condition . Enedelia shared that her mother is on hospice care due to end stage dementia . She admitted that this obviously affecting her a lot emotionally as it also triggering fears about her  own future , Enedelia explained that her mother suffered from early and relatively fast progressive dementia and her sister was just also told that she is positive for certain gene , making her predispose to develop early dementia too., PAtient even asked this writer to refer her for blood test . 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 Enedelia denied other manic symptoms and there is no signs of clinical. Enedelia  has an insight into her symptoms and vigilant about relapse.   Enedelia denied any s/h ideations, no psychotic features  elicited. . Tolerating her medications without SE [No] : no

## 2022-10-28 NOTE — REASON FOR VISIT
[Patient] : Patient [Follow-Up Visit] : a follow-up visit [FreeTextEntry1] : follow up for maintenance treatment of bipolar disorder  [Home] : at home, [unfilled] , at the time of the visit. [Medical Office: ([facility])___] : at the medical office located in  [Verbal consent obtained from patient] : the patient, [unfilled]

## 2022-10-28 NOTE — HISTORY OF PRESENT ILLNESS
[FreeTextEntry1] :  Enedelia was evaluated  in person today , She presented being well groomed with open and forthcoming , as usually. She reported that she is feeling ' good, her usual self' and reported that her anxiety and sense of having excessive emotions and energy subsided and  episodes of racing thoughts as well. 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      No other changes or triggers were elicited. Enedelia denied other manic symptoms and there is no signs of clinical. Enedelia  has an insight into her symptoms and vigilant about relapse.   Enedelia denied any s/h ideations, no psychotic features  elicited. . Tolerating her medications without SE [de-identified] :  Enedelia was evaluated  in person today , She presented being well groomed with open and forthcoming attitude , as usually. She , however, shared  that she has been feeling more anxious inside with her thoughts becoming somewhat racing She admitted that she is still sleeping okay most of the night but having difficult time to fall asleep. She reported no other stressors , except worsening of her mothers condition . Enedelia shared that her mother is on hospice care due to end stage dementia . She admitted that this obviously affecting her a lot emotionally as it also triggering fears about her  own future , Enedelia explained that her mother suffered from early and relatively fast progressive dementia and her sister was just also told that she is positive for certain gene , making her predispose to develop early dementia too., PAtient even asked this writer to refer her for blood test . 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 Enedelia denied other manic symptoms and there is no signs of clinical. Enedelia  has an insight into her symptoms and vigilant about relapse.   Enedelia denied any s/h ideations, no psychotic features  elicited. . Tolerating her medications without SE [No] : no

## 2022-11-01 ENCOUNTER — OUTPATIENT (OUTPATIENT)
Dept: OUTPATIENT SERVICES | Facility: HOSPITAL | Age: 47
LOS: 1 days | Discharge: HOME | End: 2022-11-01

## 2022-11-01 ENCOUNTER — APPOINTMENT (OUTPATIENT)
Dept: PSYCHIATRY | Facility: CLINIC | Age: 47
End: 2022-11-01

## 2022-11-01 DIAGNOSIS — F31.81 BIPOLAR II DISORDER: ICD-10-CM

## 2022-11-01 NOTE — REASON FOR VISIT
[FreeTextEntry1] : Bipolar d/o [Home] : at home, [unfilled] , at the time of the visit. [Medical Office: ([facility])___] : at the medical office located in  [Patient] : the patient

## 2022-11-01 NOTE — BEHAVIORAL HEALTH
[FreeTextEntry2] : Goal#1 Reduce Symptoms of Bipolar Disorder\par Goal #1 Objective #1 Pt will explore and process feelings, identifying two triggers of mood episodes\par Goal#1 Objective #2 Pt will learn and implement two coping skills in order to reduce symptoms of bipolar disorder\par Goal #1 Objective #3 Pt will maintain monthly med management appointments.  \par \par \par  [de-identified] : The focus of the session was on pt improvement in past 2 weeks. . Therapist provided active listening as pt shared how she has not experienced any manic symptoms, is sleeping well and her mood is good.  Therapist validated pt experience and explored reason behind her recent progress. Pt identified several things however her return to work iis most vital in  that it is  getting her out of the house daily and providing a sense of purpose. 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 Pt also reports she recently obtained a new sponsor in  , has started doing step work with her and is attending many meetings/events. Pt looks forward to visiting  her son this weekend in [facility] after having not  she had not seen him for many months.,   Pt was responsive to therapist support and interventions. [Return in ____ week(s)] : Return in [unfilled] week(s) [FreeTextEntry1] : Pt will adhere to all safety precautions related to COVID and will contact tx team for worsening of symptoms and/or problems with medication. Next appt: 11/17\par

## 2022-11-17 ENCOUNTER — OUTPATIENT (OUTPATIENT)
Dept: OUTPATIENT SERVICES | Facility: HOSPITAL | Age: 47
LOS: 1 days | Discharge: HOME | End: 2022-11-17

## 2022-11-17 ENCOUNTER — APPOINTMENT (OUTPATIENT)
Dept: PSYCHIATRY | Facility: CLINIC | Age: 47
End: 2022-11-17

## 2022-11-17 DIAGNOSIS — F31.81 BIPOLAR II DISORDER: ICD-10-CM

## 2022-11-17 NOTE — REASON FOR VISIT
[Home] : at home, [unfilled] , at the time of the visit. [FreeTextEntry1] : Bipolar d/o [Medical Office: ([facility])___] : at the medical office located in  [Patient] : the patient

## 2022-11-17 NOTE — BEHAVIORAL HEALTH
[FreeTextEntry2] : Goal#1 Reduce Symptoms of Bipolar Disorder\par Goal #1 Objective #1 Pt will explore and process feelings, identifying two triggers of mood episodes\par Goal#1 Objective #2 Pt will learn and implement two coping skills in order to reduce symptoms of bipolar disorder\par Goal #1 Objective #3 Pt will maintain monthly med management appointments.  \par \par \par  [de-identified] : The focus of the session was again  on pt improvement in past 2 weeks. . Therapist provided active listening as pt shared how she has been feeling good , and how getting back to work as helped her in many ways. Pt is also attending the gym regularly and AA meetings with her new sponsor. Therapist validated pt experience and continued progress.,  Pt denies any manic symptoms such as s=insomnia and excessive cleaning and reports she has not has these sx since she started back to work. Pt is awaiting an appt with her newly assigned psychiatrist  and will need to be seen in December. Pt reports she misses  and how much she enjoyed working with her.   Pt was responsive to therapist support and interventions. [Return in ____ week(s)] : Return in [unfilled] week(s) [FreeTextEntry1] : Pt will adhere to all safety precautions related to COVID and will contact tx team for worsening of symptoms and/or problems with medication. Next appt: 12/8\par

## 2022-11-25 NOTE — DISCUSSION/SUMMARY
[FreeTextEntry1] : RX sent in coverage, patient states she has an appointment on 12/12 at 1pm with another provider

## 2022-12-08 ENCOUNTER — OUTPATIENT (OUTPATIENT)
Dept: OUTPATIENT SERVICES | Facility: HOSPITAL | Age: 47
LOS: 1 days | Discharge: HOME | End: 2022-12-08

## 2022-12-08 ENCOUNTER — APPOINTMENT (OUTPATIENT)
Dept: PSYCHIATRY | Facility: CLINIC | Age: 47
End: 2022-12-08

## 2022-12-08 DIAGNOSIS — F31.81 BIPOLAR II DISORDER: ICD-10-CM

## 2022-12-08 NOTE — REASON FOR VISIT
[Home] : at home, [unfilled] , at the time of the visit. [Medical Office: ([facility])___] : at the medical office located in  [Patient] : the patient [FreeTextEntry1] : Bipolar d/o

## 2022-12-08 NOTE — BEHAVIORAL HEALTH
[FreeTextEntry2] : Goal#1 Reduce Symptoms of Bipolar Disorder\par Goal #1 Objective #1 Pt will explore and process feelings, identifying two triggers of mood episodes\par Goal#1 Objective #2 Pt will learn and implement two coping skills in order to reduce symptoms of bipolar disorder\par Goal #1 Objective #3 Pt will maintain monthly med management appointments.  \par \par \par  [de-identified] : The focus of the session was  on pt  distressing issue at work . 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 Pt did not feel this was a home she could handle and did not feel safe there and left.   Therapist validated pt decision to put her safety first even though she was risking her job and led her in a relaxation exercise to help ease her anxiety.  Pt identified that plans to become a  with [employer] and starts training for this in January. ,  Pt denies any manic symptoms such as insomnia and excessive cleaning k. Pt  will meet  her newly assigned psychiatrist  next week.  Pt continues to attend NA frequently and is working closely with her new sponsor of the [employer]/  Pt was responsive to therapist support and interventions. [FreeTextEntry1] : Pt will adhere to all safety precautions related to COVID and will contact tx team for worsening of symptoms and/or problems with medication. Next appt: 1/5\par

## 2022-12-12 ENCOUNTER — OUTPATIENT (OUTPATIENT)
Dept: OUTPATIENT SERVICES | Facility: HOSPITAL | Age: 47
LOS: 1 days | Discharge: HOME | End: 2022-12-12

## 2022-12-12 ENCOUNTER — APPOINTMENT (OUTPATIENT)
Dept: PSYCHIATRY | Facility: CLINIC | Age: 47
End: 2022-12-12

## 2022-12-12 DIAGNOSIS — F31.81 BIPOLAR II DISORDER: ICD-10-CM

## 2022-12-12 PROCEDURE — 99213 OFFICE O/P EST LOW 20 MIN: CPT

## 2022-12-12 NOTE — CURRENT PSYCHIATRIC SYMPTOMS
[de-identified] : current mood is euthymic  [de-identified] : no signs of hypomania  [de-identified] : no evidence of psychosis  [de-identified] : well controlled

## 2022-12-12 NOTE — REASON FOR VISIT
[Patient] : Patient [Follow-Up Visit] : a follow-up visit [FreeTextEntry1] : follow up for maintenance treatment of bipolar disorder

## 2022-12-12 NOTE — HISTORY OF PRESENT ILLNESS
[FreeTextEntry1] :  Enedelia was evaluated  in person today , She presented being well groomed with open and forthcoming , as usually. She reported that she is feeling ' good, her usual self' and reported that her anxiety and sense of having excessive emotions and energy subsided and  episodes of racing thoughts as well. 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      No other changes or triggers were elicited. Enedelia denied other manic symptoms and there is no signs of clinical. Enedelia  has an insight into her symptoms and vigilant about relapse.   Enedelia denied any s/h ideations, no psychotic features  elicited. . Tolerating her medications without SE [de-identified] :  Enedelia was evaluated  in person today , She presented being well groomed with open and forthcoming attitude , as usually. She , however, shared  that she has been feeling more anxious inside with her thoughts becoming somewhat racing She admitted that she is still sleeping okay most of the night but having difficult time to fall asleep. She reported no other stressors , except worsening of her mothers condition . 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 Enedelia denied other manic symptoms and there is no signs of clinical. Enedelia  has an insight into her symptoms and vigilant about relapse.   Enedelia denied any s/h ideations, no psychotic features  elicited. . Tolerating her medications without SE [No] : no

## 2022-12-12 NOTE — ASSESSMENT
[FreeTextEntry1] : PAtient is 45 years old,  , English speaking, , unemployed women, living alone, with history of Bipolar 2 disorder\par  and past remote history of  Cocaine dependence ( in sustained remission) who   seen today for maintenance treatment of Bipolar disorder. \taylor   Presented today  in euthymic mood, on her usual baseline of symptoms and functioning , stated resolution of excessive energy and anxiety , no recent episodes of racing thoughts and mostly satisfactory sleep.  No signs of clinical depression were elicited , .   Enedeila remains insightful and future oriented and being able to care for herself  , has no changes in daily functioning,  without S/h ideations . Compliant with her meds.\par  Will continue the same doses of Abilify and Ambien ( doesn’t want to increase the dose of Abilify) will add Hyrroxyzine 25- 50 mg for anxiety/ onset insomnia , \par    NExt follow up in 2 weeks to monitor more closely , \par  Continue individual therapy

## 2023-01-03 ENCOUNTER — EMERGENCY (EMERGENCY)
Facility: HOSPITAL | Age: 48
LOS: 0 days | Discharge: HOME | End: 2023-01-03
Attending: EMERGENCY MEDICINE | Admitting: EMERGENCY MEDICINE
Payer: OTHER MISCELLANEOUS

## 2023-01-03 VITALS
RESPIRATION RATE: 16 BRPM | SYSTOLIC BLOOD PRESSURE: 166 MMHG | DIASTOLIC BLOOD PRESSURE: 57 MMHG | WEIGHT: 220.02 LBS | HEART RATE: 78 BPM | OXYGEN SATURATION: 99 % | TEMPERATURE: 97 F
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
## 2023-01-03 DIAGNOSIS — R60.0 LOCALIZED EDEMA: ICD-10-CM

## 2023-01-03 DIAGNOSIS — M25.562 PAIN IN LEFT KNEE: ICD-10-CM

## 2023-01-03 DIAGNOSIS — Z87.891 PERSONAL HISTORY OF NICOTINE DEPENDENCE: ICD-10-CM

## 2023-01-03 PROCEDURE — 73590 X-RAY EXAM OF LOWER LEG: CPT | Mod: 26,LT

## 2023-01-03 PROCEDURE — 73552 X-RAY EXAM OF FEMUR 2/>: CPT | Mod: 26,LT

## 2023-01-03 PROCEDURE — 73562 X-RAY EXAM OF KNEE 3: CPT | Mod: 26,LT

## 2023-01-03 PROCEDURE — 99284 EMERGENCY DEPT VISIT MOD MDM: CPT

## 2023-01-03 PROCEDURE — 72170 X-RAY EXAM OF PELVIS: CPT | Mod: 26

## 2023-01-03 RX ORDER — IBUPROFEN 200 MG
600 TABLET ORAL ONCE
Refills: 0 | Status: COMPLETED | OUTPATIENT
Start: 2023-01-03 | End: 2023-01-03

## 2023-01-03 RX ADMIN — Medication 600 MILLIGRAM(S): at 11:22

## 2023-01-03 NOTE — ED PROVIDER NOTE - ATTENDING CONTRIBUTION TO CARE
47-year-old female here evaluation of left knee pain for 1 day.  Patient reports slipped fell landing on her left knee.  Patient is unable to ambulate due to pain.  She denies hitting her head and has other complaints of fall on exam patient has swelling and tenderness to the left knee and proximal tib-fib.  No pain to the left ankle left hip and patient has no other trauma findings on exam.  Impression  Patient here with left knee pain status post fall x-ray with no acute fracture patient placed in knee immobilizer discharged with outpatient follow-up.

## 2023-01-03 NOTE — ED PROVIDER NOTE - OBJECTIVE STATEMENT
46 yo female w/ no PMH presents for L knee pain x 1 day.  Slipped yesterday, fell down 4 steps, landed on her knees, no head trauma, no LOC, no blood thinners. Was unable to ambulate after due to L knee pain. Denies pain or injuries elsewhere.

## 2023-01-03 NOTE — ED PROVIDER NOTE - WR ORDER ID 3
[id number])  Manual Therapy (53193)  Neuromuscular Re-Education (62454)  Therapeutic Activity (34503)  Therapeutic Exercise (43184)  Electrical Stimulation (63864//43274)   Heat/Cold (34128)  Ultrasound/Phonophoresis (38783)    [id number]

## 2023-01-03 NOTE — ED PROVIDER NOTE - CARE PROVIDER_API CALL
Rosalva Stover  Internal Medicine  [address] [place] [address]  Phone: [phone]  Fax: [phone]  Established Patient  Follow Up Time: 1-3 Days

## 2023-01-03 NOTE — ED PROVIDER NOTE - NSFOLLOWUPCLINICS_GEN_ALL_ED_FT
[facility]  Orthpedic  [address], [place]   Phone: [phone]  Fax:   Follow Up Time: 1-3 Days

## 2023-01-03 NOTE — ED PROVIDER NOTE - NS ED ROS FT
Constitutional: No fatigue, confusion, or amnesia.  Head: No headache  ENT: No visual changes.  Cardiac:  No chest pain or SOB.  Respiratory:  No respiratory distress or hemoptysis.  GI:  No nausea, vomiting, or abdominal pain.  :  No incontinence.  MS: Reports L knee pain. No back pain.  Neuro:  No dizziness, LOC, paralysis, or N/T.  Skin:  No lacerations or abrasions.

## 2023-01-03 NOTE — ED PROVIDER NOTE - CLINICAL SUMMARY MEDICAL DECISION MAKING FREE TEXT BOX
Patient here with left knee pain status post fall x-ray with no acute fracture patient placed in knee immobilizer discharged with outpatient follow-up.

## 2023-01-03 NOTE — ED PROVIDER NOTE - NSFOLLOWUPINSTRUCTIONS_ED_ALL_ED_FT
Our Emergency Department Referral Coordinators will be reaching out ot you in the next 24-48 hours from 9:00am to 5:00pm (Monday to Friday) with a follow up appointment. Please expect a phone call from the hospital in that time frame. If you do not receive a call or if you have any questions or concerns, you can reach them at [phone] or [phone].      Knee Pain    WHAT YOU NEED TO KNOW:    What do I need to know about knee pain? Knee pain may start suddenly, or it may be a long-term problem. You may have pain on the side, front, or back of your knee. You may have knee stiffness and swelling. You may hear popping sounds or feel like your knee is giving way or locking up as you walk. You may feel pain when you sit, stand, walk, or climb up and down stairs.    What increases my risk for knee pain?     Obesity      A strain or tear in ligaments or tendons      A leg fracture or knee dislocation      Overuse of your knee      Osteoarthritis, rheumatoid arthritis, or gout      An infection, tumor, or cyst in your knee      Shoes that are not supportive, or training on a hard surface      Sports that involve jumping or pivoting on your knee    How is the cause of knee pain diagnosed? Your healthcare provider will examine your knee and ask about your symptoms. Tell your provider when the pain started and what you were doing at the time. Describe the pain, such as sharp, throbbing, or achy. Tell your provider about any knee injury or surgery you had. You may need any of the following:     MRI, CT, or ultrasound pictures may show an injury, fracture, or tumor.       Blood tests may be used to check the level of inflammation in your blood. The tests may also show signs of infection.      Arthroscopy is a procedure to look inside your knee joint with an arthroscope. An arthroscope is a flexible tube with a light and camera on the end. A knee arthroscopy is usually done to check for disease or damage inside your knee. These problems may be fixed during the procedure.    How is knee pain treated? Treatment will depend on the cause of your pain. You may need any of the following:     NSAIDs help decrease swelling and pain or fever. This medicine is available with or without a doctor's order. NSAIDs can cause stomach bleeding or kidney problems in certain people. If you take blood thinner medicine, always ask your healthcare provider if NSAIDs are safe for you. Always read the medicine label and follow directions.      Acetaminophen decreases pain and fever. It is available without a doctor's order. Ask how much to take and how often to take it. Follow directions. Read the labels of all other medicines you are using to see if they also contain acetaminophen, or ask your doctor or pharmacist. Acetaminophen can cause liver damage if not taken correctly. Do not use more than 4 grams (4,000 milligrams) total of acetaminophen in one day.       Prescription pain medicine may be given. Ask your healthcare provider how to take this medicine safely. Some prescription pain medicines contain acetaminophen. Do not take other medicines that contain acetaminophen without talking to your healthcare provider. Too much acetaminophen may cause liver damage. Prescription pain medicine may cause constipation. Ask your healthcare provider how to prevent or treat constipation.       Steroid injections may be given into your knee. Steroids reduce inflammation and pain.      Surgery may be used for some injuries, such as to repair a torn ACL.    What can I do to manage my symptoms?     Rest your knee so it can heal. Limit activities that increase your pain. Do low-impact exercises, such as walking or swimming.       Apply ice to help reduce swelling and pain. Use an ice pack, or put crushed ice in a plastic bag. Cover it with a towel before you apply it to your knee. Apply ice for 15 to 20 minutes every hour, or as directed.      Apply compression to help reduce swelling. Use a brace or bandage only as directed.      Elevate your knee to help decrease pain and swelling. Elevate your knee while you are sitting or lying down. Prop your leg on pillows to keep your knee above the level of your heart.      Prevent your knee from moving as directed. Your healthcare provider may put on a cast or splint. You may need to wear a leg brace to stabilize your knee. A leg brace can be adjusted to increase your range of motion as your knee heals.Hinged Knee Braces          What can I do to prevent knee pain?     Maintain a healthy weight. Extra weight increases your risk for knee pain. Ask your healthcare provider how much you should weigh. He or she can help you create a safe weight loss plan if you need to lose weight.      Exercise or train properly. Use the correct equipment for sports. Wear shoes that provide good support. Check your posture often as you exercise, play sports, or train for an event. This can help prevent stress and strain on your knees. Rest between sessions so you do not overwork your knees.    When should I seek immediate care?     Your pain is worse, even after treatment.       You cannot bend or straighten your leg completely.       The swelling around your knee does not go down even with treatment.      Your knee is painful and hot to the touch.     When should I contact my healthcare provider?     You have questions or concerns about your condition or care

## 2023-01-05 ENCOUNTER — APPOINTMENT (OUTPATIENT)
Dept: PSYCHIATRY | Facility: CLINIC | Age: 48
End: 2023-01-05

## 2023-01-05 ENCOUNTER — OUTPATIENT (OUTPATIENT)
Dept: OUTPATIENT SERVICES | Facility: HOSPITAL | Age: 48
LOS: 1 days | Discharge: HOME | End: 2023-01-05

## 2023-01-05 DIAGNOSIS — F31.81 BIPOLAR II DISORDER: ICD-10-CM

## 2023-01-06 NOTE — BEHAVIORAL HEALTH
[FreeTextEntry2] : Goal#1 Reduce Symptoms of Bipolar Disorder\par Goal #1 Objective #1 Pt will explore and process feelings, identifying two triggers of mood episodes\par Goal#1 Objective #2 Pt will learn and implement two coping skills in order to reduce symptoms of bipolar disorder\par Goal #1 Objective #3 Pt will maintain monthly med management appointments.  \par \par \par  [de-identified] : The focus of the session was  on pt recent injury.  Therapist provided active listening as pt shared how she took a bad fall and torn ligaments in her leg, requiring a soft cast and crutches. Pt reports she is now home bound and she finds it difficult to care for herself because she lives alone.   Therapist validated pt feelings and explored how she has been coping with both the pain and caring for herself thus far.  Pt identified taking Motrin, as she will not use any narcotics, and so far she has had a neighbor assist her with some meals. Therapist and pt problem solved other ways for her to obtain food and assistance such as friends from .  Pt denies any manic symptoms such as insomnia and excessive cleaning. Pt shared that she recently met someone and feels good about this relationship.  Pt was responsive to therapist support and interventions. [FreeTextEntry1] : Pt will adhere to all safety precautions related to COVID and will contact tx team for worsening of symptoms and/or problems with medication. Next appt: 1/26\par

## 2023-01-06 NOTE — REASON FOR VISIT
[Home] : at home, [unfilled] , at the time of the visit. [facility] : at the medical office located in  [Patient] : the patient [FreeTextEntry1] : Bipolar d/o

## 2023-01-12 ENCOUNTER — APPOINTMENT (OUTPATIENT)
Dept: PSYCHIATRY | Facility: CLINIC | Age: 48
End: 2023-01-12
Payer: MEDICARE

## 2023-01-12 ENCOUNTER — OUTPATIENT (OUTPATIENT)
Dept: OUTPATIENT SERVICES | Facility: HOSPITAL | Age: 48
LOS: 1 days | Discharge: HOME | End: 2023-01-12

## 2023-01-12 DIAGNOSIS — F31.81 BIPOLAR II DISORDER: ICD-10-CM

## 2023-01-12 PROCEDURE — 99213 OFFICE O/P EST LOW 20 MIN: CPT

## 2023-01-12 NOTE — CURRENT PSYCHIATRIC SYMPTOMS
[de-identified] : current mood is euthymic  [de-identified] : no signs of hypomania  [de-identified] : no evidence of psychosis  [de-identified] : well controlled

## 2023-01-12 NOTE — HISTORY OF PRESENT ILLNESS
[FreeTextEntry1] :  Enedelia was evaluated  in person today , She presented being well groomed with open and forthcoming , as usually. She reported that she is feeling ' good, her usual self' and reported that her anxiety and sense of having excessive emotions and energy subsided and  episodes of racing thoughts as well. 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      No other changes or triggers were elicited. Enedelia denied other manic symptoms and there is no signs of clinical. Enedelia  has an insight into her symptoms and vigilant about relapse.   Enedelia denied any s/h ideations, no psychotic features  elicited. . Tolerating her medications without SE [de-identified] :  Enedelia was evaluated  in person today , She presented being well groomed with open and forthcoming attitude , as usually. She , however, shared  that she has been feeling more anxious inside with her thoughts becoming somewhat racing She admitted that she is still sleeping okay most of the night but having difficult time to fall asleep. She reported no other stressors , except worsening of her mothers condition . 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 Enedelia denied other manic symptoms and there is no signs of clinical. Enedelia  has an insight into her symptoms and vigilant about relapse.   Enedelia denied any s/h ideations, no psychotic features  elicited. . Tolerating her medications without SE [No] : no

## 2023-01-26 ENCOUNTER — APPOINTMENT (OUTPATIENT)
Dept: PSYCHIATRY | Facility: CLINIC | Age: 48
End: 2023-01-26

## 2023-02-14 ENCOUNTER — APPOINTMENT (OUTPATIENT)
Dept: PSYCHIATRY | Facility: CLINIC | Age: 48
End: 2023-02-14

## 2023-02-14 ENCOUNTER — APPOINTMENT (OUTPATIENT)
Age: 48
End: 2023-02-14

## 2023-02-28 ENCOUNTER — OUTPATIENT (OUTPATIENT)
Dept: OUTPATIENT SERVICES | Facility: HOSPITAL | Age: 48
LOS: 1 days | End: 2023-02-28
Payer: MEDICARE

## 2023-02-28 ENCOUNTER — APPOINTMENT (OUTPATIENT)
Dept: PSYCHIATRY | Facility: CLINIC | Age: 48
End: 2023-02-28

## 2023-02-28 DIAGNOSIS — F31.81 BIPOLAR II DISORDER: ICD-10-CM

## 2023-02-28 DIAGNOSIS — Z00.8 ENCOUNTER FOR OTHER GENERAL EXAMINATION: ICD-10-CM

## 2023-02-28 PROCEDURE — 90832 PSYTX W PT 30 MINUTES: CPT

## 2023-02-28 NOTE — REASON FOR VISIT
[Patient preference] : as per patient preference [Continuing, patient seen in-person within last 12 months] : Telehealth services are continuing as patient has been seen in-person within last 12 months. [Telehealth (audio & video) - Individual/Group] : This visit was provided via telehealth using real-time 2-way audio visual technology. [Medical Office: ([facility] [place])___] : The provider was located at the medical office in [unfilled]. [Home] : The patient, [unfilled], was located at home, [unfilled], at the time of the visit. [Verbal consent obtained from patient/other participant(s)] : Verbal consent for telehealth/telephonic services obtained from patient/other participant(s) [Patient] : Patient [FreeTextEntry4] : 5:45pm [FreeTextEntry5] : 6:10pm [FreeTextEntry1] : Bipolar d/o
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
## 2023-03-09 ENCOUNTER — APPOINTMENT (OUTPATIENT)
Dept: PSYCHIATRY | Facility: CLINIC | Age: 48
End: 2023-03-09

## 2023-03-09 ENCOUNTER — APPOINTMENT (OUTPATIENT)
Dept: PSYCHIATRY | Facility: CLINIC | Age: 48
End: 2023-03-09
Payer: MEDICARE

## 2023-03-09 ENCOUNTER — OUTPATIENT (OUTPATIENT)
Dept: OUTPATIENT SERVICES | Facility: HOSPITAL | Age: 48
LOS: 1 days | End: 2023-03-09
Payer: MEDICARE

## 2023-03-09 DIAGNOSIS — F31.81 BIPOLAR II DISORDER: ICD-10-CM

## 2023-03-09 DIAGNOSIS — F33.1 MAJOR DEPRESSIVE DISORDER, RECURRENT, MODERATE: ICD-10-CM

## 2023-03-09 PROCEDURE — 99213 OFFICE O/P EST LOW 20 MIN: CPT

## 2023-03-09 NOTE — CURRENT PSYCHIATRIC SYMPTOMS
[de-identified] : current mood is euthymic  [de-identified] : no signs of hypomania  [de-identified] : no evidence of psychosis  [de-identified] : well controlled

## 2023-03-09 NOTE — HISTORY OF PRESENT ILLNESS
[FreeTextEntry1] :  Enedelia was evaluated  in person today , She presented being well groomed with open and forthcoming , as usually. She reported that she is feeling ' good, her usual self' and reported that her anxiety and sense of having excessive emotions and energy subsided and  episodes of racing thoughts as well. 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      No other changes or triggers were elicited. Enedelia denied other manic symptoms and there is no signs of clinical. Enedelia  has an insight into her symptoms and vigilant about relapse.   Enedelia denied any s/h ideations, no psychotic features  elicited. . Tolerating her medications without SE [de-identified] :  Enedelia was evaluated  in person today , She presented being well groomed with open and forthcoming attitude , as usually. She , however, shared  that she has been feeling more anxious inside with her thoughts becoming somewhat racing She admitted that she is still sleeping okay most of the night but having difficult time to fall asleep. She reported no other stressors , except worsening of her mothers condition . 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 Enedelia denied other manic symptoms and there is no signs of clinical. Enedelia  has an insight into her symptoms and vigilant about relapse.   Enedelia denied any s/h ideations, no psychotic features  elicited. . Tolerating her medications without SE [No] : no

## 2023-03-21 ENCOUNTER — NON-APPOINTMENT (OUTPATIENT)
Age: 48
End: 2023-03-21

## 2023-03-21 ENCOUNTER — APPOINTMENT (OUTPATIENT)
Dept: PSYCHIATRY | Facility: CLINIC | Age: 48
End: 2023-03-21

## 2023-04-27 ENCOUNTER — OUTPATIENT (OUTPATIENT)
Dept: OUTPATIENT SERVICES | Facility: HOSPITAL | Age: 48
LOS: 1 days | End: 2023-04-27
Payer: MEDICARE

## 2023-04-27 ENCOUNTER — APPOINTMENT (OUTPATIENT)
Dept: PSYCHIATRY | Facility: CLINIC | Age: 48
End: 2023-04-27

## 2023-04-27 DIAGNOSIS — F31.9 BIPOLAR DISORDER, UNSPECIFIED: ICD-10-CM

## 2023-04-27 DIAGNOSIS — F31.81 BIPOLAR II DISORDER: ICD-10-CM

## 2023-04-27 PROCEDURE — 90832 PSYTX W PT 30 MINUTES: CPT

## 2023-04-27 NOTE — BEHAVIORAL HEALTH
[Return in ____ week(s)] : Return in [unfilled] week(s) [FreeTextEntry2] : Goal#1 Reduce Symptoms of Bipolar Disorder\par Goal #1 Objective #1 Pt will explore and process feelings, identifying two triggers of mood episodes\par Goal#1 Objective #2 Pt will learn and implement two coping skills in order to reduce symptoms of bipolar disorder\par Goal #1 Objective #3 Pt will maintain monthly med management appointments.  \par \par \par  [de-identified] : The focus of the session was on pt sobriety. Therapist provided active listening as pt shared  how she decided to have a drink after being sober for 15 years and is now starting over again. Pt reports she felt guilty the next day and doers not wish to start using alcohol or drugs.  Therapist validated pt feelings  and explored why she decided to  that day. .  Pt identified increased anxiety and stress due to financial recently which has been causing depressed mood. Pt reports she wanted to see if she could have one drink, what it would be like  but she determined it is no longer for her. Pt resumed NA and is looking forward to gaining back her 90 days so she can speak at meetings again. Pt was advised to see  earlier than June due to increased sx of depression and pt set up earlier appt. Pt was made aware of case  reassignment and changes to clinic structure and while she was upset she would need to start with someone new she was agreeable. Will continue to process pt feeling's with regard to this transition and work with her in improved TX compliance. [FreeTextEntry1] : Pt will adhere to all safety precautions related to COVID and will contact tx team for worsening of symptoms and/or problems with medication. Next appt:  5/11\par

## 2023-04-27 NOTE — REASON FOR VISIT
[Patient] : Patient [FreeTextEntry4] : 10:00am [FreeTextEntry5] : 10:35am [FreeTextEntry1] : Bipolar d/o

## 2023-04-28 DIAGNOSIS — F31.9 BIPOLAR DISORDER, UNSPECIFIED: ICD-10-CM

## 2023-04-28 DIAGNOSIS — F31.81 BIPOLAR II DISORDER: ICD-10-CM

## 2023-05-11 ENCOUNTER — APPOINTMENT (OUTPATIENT)
Dept: PSYCHIATRY | Facility: CLINIC | Age: 48
End: 2023-05-11

## 2023-05-11 ENCOUNTER — OUTPATIENT (OUTPATIENT)
Dept: OUTPATIENT SERVICES | Facility: HOSPITAL | Age: 48
LOS: 1 days | End: 2023-05-11
Payer: MEDICARE

## 2023-05-11 DIAGNOSIS — F31.9 BIPOLAR DISORDER, UNSPECIFIED: ICD-10-CM

## 2023-05-11 DIAGNOSIS — F31.81 BIPOLAR II DISORDER: ICD-10-CM

## 2023-05-11 PROCEDURE — 90832 PSYTX W PT 30 MINUTES: CPT
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
## 2023-05-12 DIAGNOSIS — F31.81 BIPOLAR II DISORDER: ICD-10-CM

## 2023-05-17 ENCOUNTER — APPOINTMENT (OUTPATIENT)
Dept: PSYCHIATRY | Facility: CLINIC | Age: 48
End: 2023-05-17
Payer: MEDICARE

## 2023-05-17 ENCOUNTER — OUTPATIENT (OUTPATIENT)
Dept: OUTPATIENT SERVICES | Facility: HOSPITAL | Age: 48
LOS: 1 days | End: 2023-05-17
Payer: MEDICARE

## 2023-05-17 DIAGNOSIS — F33.1 MAJOR DEPRESSIVE DISORDER, RECURRENT, MODERATE: ICD-10-CM

## 2023-05-17 DIAGNOSIS — F31.81 BIPOLAR II DISORDER: ICD-10-CM

## 2023-05-17 PROCEDURE — 99213 OFFICE O/P EST LOW 20 MIN: CPT

## 2023-05-17 RX ORDER — ARIPIPRAZOLE 10 MG/1
10 TABLET ORAL DAILY
Qty: 30 | Refills: 3 | Status: ACTIVE | COMMUNITY
Start: 1900-01-01 | End: 1900-01-01

## 2023-05-17 RX ORDER — ZOLPIDEM TARTRATE 10 MG/1
10 TABLET ORAL
Qty: 30 | Refills: 3 | Status: ACTIVE | COMMUNITY
Start: 1900-01-01 | End: 1900-01-01

## 2023-05-17 NOTE — CURRENT PSYCHIATRIC SYMPTOMS
[de-identified] : current mood is euthymic  [de-identified] : no signs of hypomania  [de-identified] : no evidence of psychosis  [de-identified] : well controlled

## 2023-05-17 NOTE — HISTORY OF PRESENT ILLNESS
[FreeTextEntry1] :  Enedelia was evaluated  in person today , She presented being well groomed with open and forthcoming , as usually. She reported that she is feeling ' good, her usual self' and reported that her anxiety and sense of having excessive emotions and energy subsided and  episodes of racing thoughts as well. 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      No other changes or triggers were elicited. Enedelia denied other manic symptoms and there is no signs of clinical. Enedelia  has an insight into her symptoms and vigilant about relapse.   Enedelia denied any s/h ideations, no psychotic features  elicited. . Tolerating her medications without SE [de-identified] :  Enedelia was evaluated  in person today , She presented being well groomed with open and forthcoming attitude , as usually. She , however, shared  that she has been feeling more anxious inside with her thoughts becoming somewhat racing She admitted that she is still sleeping okay most of the night but having difficult time to fall asleep. She reported no other stressors , except worsening of her mothers condition . 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 Enedelia denied other manic symptoms and there is no signs of clinical. Enedelia  has an insight into her symptoms and vigilant about relapse.   Enedelia denied any s/h ideations, no psychotic features  elicited. . Tolerating her medications without SE [No] : no

## 2023-05-18 DIAGNOSIS — F31.81 BIPOLAR II DISORDER: ICD-10-CM

## 2023-05-25 ENCOUNTER — OUTPATIENT (OUTPATIENT)
Dept: OUTPATIENT SERVICES | Facility: HOSPITAL | Age: 48
LOS: 1 days | End: 2023-05-25
Payer: MEDICARE

## 2023-05-25 ENCOUNTER — APPOINTMENT (OUTPATIENT)
Dept: PSYCHIATRY | Facility: CLINIC | Age: 48
End: 2023-05-25

## 2023-05-25 DIAGNOSIS — F31.9 BIPOLAR DISORDER, UNSPECIFIED: ICD-10-CM

## 2023-05-25 DIAGNOSIS — F31.81 BIPOLAR II DISORDER: ICD-10-CM

## 2023-05-25 PROCEDURE — 90832 PSYTX W PT 30 MINUTES: CPT | Mod: 95

## 2023-05-26 DIAGNOSIS — F31.81 BIPOLAR II DISORDER: ICD-10-CM
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
## 2023-05-26 NOTE — REASON FOR VISIT
[Patient] : Patient [Patient preference] : as per patient preference [Continuing, patient seen in-person within last 12 months] : Telehealth services are continuing as patient has been seen in-person within last 12 months. [Telehealth (audio & video) - Individual/Group] : This visit was provided via telehealth using real-time 2-way audio visual technology. [Other Location: e.g. Home (Enter Location, City,State)___] : The provider was located at [unfilled]. [Home] : The patient, [unfilled], was located at home, [unfilled], at the time of the visit. [Verbal consent obtained from patient/other participant(s)] : Verbal consent for telehealth/telephonic services obtained from patient/other participant(s) [FreeTextEntry4] : 4:00pm [FreeTextEntry5] : 4:30pm [FreeTextEntry1] : Bipolar d/o

## 2023-06-12 ENCOUNTER — OUTPATIENT (OUTPATIENT)
Dept: OUTPATIENT SERVICES | Facility: HOSPITAL | Age: 48
LOS: 1 days | End: 2023-06-12
Payer: MEDICARE

## 2023-06-12 ENCOUNTER — APPOINTMENT (OUTPATIENT)
Dept: PSYCHIATRY | Facility: CLINIC | Age: 48
End: 2023-06-12
Payer: MEDICARE

## 2023-06-12 DIAGNOSIS — F33.1 MAJOR DEPRESSIVE DISORDER, RECURRENT, MODERATE: ICD-10-CM

## 2023-06-12 DIAGNOSIS — F31.81 BIPOLAR II DISORDER: ICD-10-CM

## 2023-06-12 PROCEDURE — 99213 OFFICE O/P EST LOW 20 MIN: CPT

## 2023-06-12 NOTE — CURRENT PSYCHIATRIC SYMPTOMS
[de-identified] : current mood is euthymic  [de-identified] : no signs of hypomania  [de-identified] : no evidence of psychosis  [de-identified] : well controlled

## 2023-06-12 NOTE — HISTORY OF PRESENT ILLNESS
[FreeTextEntry1] :  Enedelia was evaluated  in person today , She presented being well groomed with open and forthcoming , as usually. She reported that she is feeling ' good, her usual self' and reported that her anxiety and sense of having excessive emotions and energy subsided and  episodes of racing thoughts as well. 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      No other changes or triggers were elicited. Enedelia denied other manic symptoms and there is no signs of clinical. Enedelia  has an insight into her symptoms and vigilant about relapse.   Enedelia denied any s/h ideations, no psychotic features  elicited. . Tolerating her medications without SE\par \par discussed about my nursing home and she will be transitioning to pvt practice. [de-identified] :  Enedelia was evaluated  in person today , She presented being well groomed with open and forthcoming attitude , as usually. She , however, shared  that she has been feeling more anxious inside with her thoughts becoming somewhat racing She admitted that she is still sleeping okay most of the night but having difficult time to fall asleep. She reported no other stressors , except worsening of her mothers condition . 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 Enedelia denied other manic symptoms and there is no signs of clinical. Enedelia  has an insight into her symptoms and vigilant about relapse.   Enedelia denied any s/h ideations, no psychotic features  elicited. . Tolerating her medications without SE [No] : no

## 2023-06-13 DIAGNOSIS — F31.81 BIPOLAR II DISORDER: ICD-10-CM

## 2023-06-21 ENCOUNTER — NON-APPOINTMENT (OUTPATIENT)
Age: 48
End: 2023-06-21

## 2023-06-21 NOTE — DISCUSSION/SUMMARY
[FreeTextEntry1] : Enedelia is transferred to this therapist from Mirna Ang LCSW \taylor A voice mail is left for this PT to call for scheduling our first therapy session

## 2023-07-27 ENCOUNTER — NON-APPOINTMENT (OUTPATIENT)
Age: 48
End: 2023-07-27

## 2023-07-27 NOTE — DISCUSSION/SUMMARY
[FreeTextEntry1] : Pt was advised we cannot spoilt services and she must find a therapist outside the clinic since she will be using a private psychiatrist. Pt was agreeable to this.

## 2023-09-20 ENCOUNTER — APPOINTMENT (OUTPATIENT)
Dept: NEUROLOGY | Facility: CLINIC | Age: 48
End: 2023-09-20

## 2023-11-02 ENCOUNTER — APPOINTMENT (OUTPATIENT)
Dept: NEUROLOGY | Facility: CLINIC | Age: 48
End: 2023-11-02
Payer: MEDICARE

## 2023-11-02 VITALS
WEIGHT: 190 LBS | BODY MASS INDEX: 31.65 KG/M2 | DIASTOLIC BLOOD PRESSURE: 78 MMHG | SYSTOLIC BLOOD PRESSURE: 123 MMHG | HEIGHT: 65 IN | HEART RATE: 83 BPM

## 2023-11-02 DIAGNOSIS — Z82.0 FAMILY HISTORY OF EPILEPSY AND OTHER DISEASES OF THE NERVOUS SYSTEM: ICD-10-CM

## 2023-11-02 PROCEDURE — 99204 OFFICE O/P NEW MOD 45 MIN: CPT

## 2023-11-14 ENCOUNTER — APPOINTMENT (OUTPATIENT)
Dept: NEUROLOGY | Facility: CLINIC | Age: 48
End: 2023-11-14
Payer: MEDICARE

## 2023-11-14 PROCEDURE — 95886 MUSC TEST DONE W/N TEST COMP: CPT

## 2023-11-14 PROCEDURE — 95911 NRV CNDJ TEST 9-10 STUDIES: CPT

## 2023-11-21 ENCOUNTER — APPOINTMENT (OUTPATIENT)
Dept: NEUROLOGY | Facility: CLINIC | Age: 48
End: 2023-11-21

## 2023-12-01 ENCOUNTER — APPOINTMENT (OUTPATIENT)
Dept: MRI IMAGING | Facility: CLINIC | Age: 48
End: 2023-12-01
Payer: MEDICARE

## 2023-12-01 PROCEDURE — 70551 MRI BRAIN STEM W/O DYE: CPT | Mod: MH

## 2023-12-05 ENCOUNTER — APPOINTMENT (OUTPATIENT)
Dept: NEUROLOGY | Facility: CLINIC | Age: 48
End: 2023-12-05
Payer: MEDICARE

## 2023-12-05 PROCEDURE — 95886 MUSC TEST DONE W/N TEST COMP: CPT

## 2023-12-05 PROCEDURE — 95912 NRV CNDJ TEST 11-12 STUDIES: CPT

## 2023-12-14 ENCOUNTER — APPOINTMENT (OUTPATIENT)
Dept: NEUROLOGY | Facility: CLINIC | Age: 48
End: 2023-12-14

## 2023-12-18 ENCOUNTER — APPOINTMENT (OUTPATIENT)
Dept: NEUROLOGY | Facility: CLINIC | Age: 48
End: 2023-12-18
Payer: MEDICARE

## 2023-12-18 VITALS
HEART RATE: 74 BPM | SYSTOLIC BLOOD PRESSURE: 110 MMHG | WEIGHT: 190 LBS | HEIGHT: 65 IN | BODY MASS INDEX: 31.65 KG/M2 | DIASTOLIC BLOOD PRESSURE: 73 MMHG

## 2023-12-18 DIAGNOSIS — R68.89 OTHER GENERAL SYMPTOMS AND SIGNS: ICD-10-CM

## 2023-12-18 DIAGNOSIS — F31.81 BIPOLAR II DISORDER: ICD-10-CM

## 2023-12-18 DIAGNOSIS — G62.9 POLYNEUROPATHY, UNSPECIFIED: ICD-10-CM

## 2023-12-18 PROCEDURE — 99214 OFFICE O/P EST MOD 30 MIN: CPT

## 2023-12-18 NOTE — REASON FOR VISIT
[Follow-Up: _____] : a [unfilled] follow-up visit [Consultation] : a consultation visit [FreeTextEntry1] : image review

## 2023-12-18 NOTE — HISTORY OF PRESENT ILLNESS
[FreeTextEntry1] : Ms. Mcgowan is a 48-year-old woman who presents today in neurologic consultation for two issues. The first being a strong family history of Alzheimer's type dementia. She is forgetful, mislays objects, and repeats herself. Patient has to be told the same things over again. She is very concerned she may have beginning signs of Alzheimer's disease. The other issue is that of pins and needles, tingling sensation in her fingers and toes. Dr. Mansfield checked her nerves with an EMG and told her she had neuropathy. She is not a diabetic. Patient is very concerned about this as well.   Diagnostic Testing :  EMG Uppers 11.14.23 : Normal  EMG Lowers 11.14.23 : Normal  MRI Brain 12.1.23 : Normal MRI Brain. Right sinus retention cyst / polyp   Today : Today I had the pleasure of seeing Ms. Mcgowan   in our office for follow up.  The patients previous history and physical findings have been reviewed.    Ms. Mcgowan  remains under our neurologic care for evaluation of abnormal sensation / "pins and needles" to her finger and toes as well as reported memory decline with +family history of Alzheimer's.  Today, we reviewed her EMG testing of the upper extremities, EMG of the Lower extremities, and MRI of the Brain noted above. OF NOTE :  patient has +pmhx of Bipolar 2 disorder and history of cocaine dependence, alcohol dependence insomnia and anxiety for which she has been under the care of Dr. Gunn and has been under going Psychotherapy from January 2012 to June 2023. As per evaluation with Dr. Xiong on 11.02.23, patient's examination and MMSE were normal for patients age. Today patient states that the pins and needles sensation she previously noted to bilateral fingers and toes have decreased in severity and frequency since her last visit on 11.2.23 and she states her symptoms "depend on how she sleeps". On exam she maintains 5/5 power to bilateral upper and lower extremities with good tone, no involuntary movements, or muscle atrophy, 2+ reflexes throughout. 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 On exam she is AOx3 normal affect and mood follows all commands and demonstrates good insight and judgement.

## 2023-12-18 NOTE — ASSESSMENT
[FreeTextEntry1] : 48 year old female with pmhx of bipolar 2, alcohol and cocaine dependence, anxiety and insomnia, under our care for evaluation of reported memory changes in the setting of  +fmhx of Alzheimer's as well as reported  "pins and needles to her finger and toes." Today we reviewed her MRI of the brain, and EMG testing of both the upper and lower extremities noted above. Patient's MMSE preformed by Dr. Xiong was normal for patients age and MRI Brain negative for abnormal pathology. We discussed discussing her memory changes with Neuropsychology for formal evaluation if they continue or progress however patient states "she thinks it may be normal". Regarding her numbness and tingling, patient states her symptoms have improved and feels they are related to sleep position. I discussed getting Lab work if her symptoms continue however patient does not wish for further workup at this time. She will follow up in 6 months.   Supervising Physician : Bernardo Xiong MD

## 2023-12-18 NOTE — DATA REVIEWED
[de-identified] : MRI Brain 12.1.23 : Normal MRI Brain. Right sinus retention cyst / polyp  [de-identified] : EMG Uppers 11.14.23 : Normal  EMG Lowers 11.14.23 : Normal

## 2023-12-18 NOTE — PHYSICAL EXAM
[General Appearance - Alert] : alert [General Appearance - In No Acute Distress] : in no acute distress [Oriented To Time, Place, And Person] : oriented to person, place, and time [Impaired Insight] : insight and judgment were intact [Affect] : the affect was normal [Person] : oriented to person [Place] : oriented to place [Time] : oriented to time [Concentration Intact] : normal concentrating ability [Visual Intact] : visual attention was ~T not ~L decreased [Naming Objects] : no difficulty naming common objects [Repeating Phrases] : no difficulty repeating a phrase [Writing A Sentence] : no difficulty writing a sentence [Fluency] : fluency intact [Comprehension] : comprehension intact [Reading] : reading intact [Past History] : adequate knowledge of personal past history [Cranial Nerves Optic (II)] : visual acuity intact bilaterally,  visual fields full to confrontation, pupils equal round and reactive to light [Cranial Nerves Oculomotor (III)] : extraocular motion intact [Cranial Nerves Trigeminal (V)] : facial sensation intact symmetrically [Cranial Nerves Facial (VII)] : face symmetrical [Cranial Nerves Vestibulocochlear (VIII)] : hearing was intact bilaterally [Cranial Nerves Glossopharyngeal (IX)] : tongue and palate midline [Cranial Nerves Accessory (XI - Cranial And Spinal)] : head turning and shoulder shrug symmetric [Cranial Nerves Hypoglossal (XII)] : there was no tongue deviation with protrusion [Motor Strength] : muscle strength was normal in all four extremities [No Muscle Atrophy] : normal bulk in all four extremities [Sensation Tactile Decrease] : light touch was intact [Balance] : balance was intact [2+] : Ankle jerk left 2+ [PERRL With Normal Accommodation] : pupils were equal in size, round, reactive to light, with normal accommodation [Extraocular Movements] : extraocular movements were intact [Hearing Threshold Finger Rub Not [place]] : hearing was normal [Neck Appearance] : the appearance of the neck was normal [No Spinal Tenderness] : no spinal tenderness [Abnormal Walk] : normal gait [Involuntary Movements] : no involuntary movements were seen [Motor Tone] : muscle strength and tone were normal [FreeTextEntry1] : PHYSICAL EXAMINATION: Head: Normocephalic, atraumatic. Negative TA tenderness/prominence.   Neck: Supple with full range of motion; nontender with negative bilateral Spurling's signs.   Spine: Full range of motion; nontender. Negative straight leg raise maneuvers.   Extremities: Non-tender. Atraumatic. Negative Tinel's signs.   NEUROLOGICAL EXAMINATION:   Mental Status: Patient scored 29/30 on the MMSE which is normal.   Cranial Nerves Cranial Nerves:   II, III, IV, VI: Pupils are equal, round, and reactive to light and accommodation. No evidence of afferent pupillary defect. Visual fields are full to confrontation. Eye movements are full without evidence of nystagmus or internuclear ophthalmoplegia. Funduscopic examination reveals sharp disc margins.   V: Normal jaw movements. Normal facial sensation.   VII: Normal facial motor testing.   VIII: Grossly normal hearing bilaterally.   IX, X: Palate moves symmetrically. No dysarthria.   XI: Normal shoulder shrug and sternocleidomastoid power.   XII: Tongue appears normal and protrudes in the midline.   Motor: Normal bulk, tone, and power throughout.   Muscle Stretch Reflexes (right/left): 2+ symmetrical.   Plantar Responses: Flexor bilaterally.   Coordination: Normal finger to nose and heel to shin testing, no truncal ataxia and no tremor.   Sensation: Normal primary sensation. Normal double simultaneous stimulation.   Gait and Station: Normal base, stride, and turning. Normal toe and heel walking. Normal tandem. Negative Romberg. [Motor Strength Upper Extremities Bilaterally] : strength was normal in both upper extremities [Motor Strength Lower Extremities Bilaterally] : strength was normal in both lower extremities [Past-pointing] : there was no past-pointing [Tremor] : no tremor present [Plantar Reflex Right Only] : normal on the right [Plantar Reflex Left Only] : normal on the left

## 2023-12-18 NOTE — REVIEW OF SYSTEMS
[Memory Lapses or Loss] : memory loss [Repeating Questions] : repeated questioning about recent events [Numbness] : numbness [Tingling] : tingling [Tension Headache] : tension-type headaches [Sleep Disturbances] : sleep disturbances [Eyesight Problems] : eyesight problems

## 2024-03-27 ENCOUNTER — APPOINTMENT (OUTPATIENT)
Dept: SURGERY | Facility: CLINIC | Age: 49
End: 2024-03-27

## 2024-05-09 ENCOUNTER — APPOINTMENT (OUTPATIENT)
Dept: SURGERY | Facility: CLINIC | Age: 49
End: 2024-05-09

## 2024-06-17 ENCOUNTER — APPOINTMENT (OUTPATIENT)
Dept: NEUROLOGY | Facility: CLINIC | Age: 49
End: 2024-06-17

## 2024-07-03 ENCOUNTER — APPOINTMENT (OUTPATIENT)
Dept: SURGERY | Facility: CLINIC | Age: 49
End: 2024-07-03

## 2024-07-15 ENCOUNTER — APPOINTMENT (OUTPATIENT)
Dept: SURGERY | Facility: CLINIC | Age: 49
End: 2024-07-15

## 2024-07-18 ENCOUNTER — EMERGENCY (EMERGENCY)
Facility: HOSPITAL | Age: 49
LOS: 0 days | Discharge: HOME | End: 2024-07-18
Attending: STUDENT IN AN ORGANIZED HEALTH CARE EDUCATION/TRAINING PROGRAM
Payer: MEDICARE

## 2024-07-18 VITALS
DIASTOLIC BLOOD PRESSURE: 73 MMHG | HEART RATE: 70 BPM | RESPIRATION RATE: 17 BRPM | TEMPERATURE: 98 F | OXYGEN SATURATION: 98 % | SYSTOLIC BLOOD PRESSURE: 121 MMHG

## 2024-07-18 VITALS
SYSTOLIC BLOOD PRESSURE: 127 MMHG | OXYGEN SATURATION: 100 % | RESPIRATION RATE: 16 BRPM | TEMPERATURE: 98 F | DIASTOLIC BLOOD PRESSURE: 75 MMHG | HEART RATE: 78 BPM

## 2024-07-18 DIAGNOSIS — R10.32 LEFT LOWER QUADRANT PAIN: ICD-10-CM

## 2024-07-18 DIAGNOSIS — Z90.49 ACQUIRED ABSENCE OF OTHER SPECIFIED PARTS OF DIGESTIVE TRACT: Chronic | ICD-10-CM

## 2024-07-18 DIAGNOSIS — Z90.49 ACQUIRED ABSENCE OF OTHER SPECIFIED PARTS OF DIGESTIVE TRACT: ICD-10-CM

## 2024-07-18 DIAGNOSIS — R11.2 NAUSEA WITH VOMITING, UNSPECIFIED: ICD-10-CM

## 2024-07-18 DIAGNOSIS — R50.9 FEVER, UNSPECIFIED: ICD-10-CM

## 2024-07-18 DIAGNOSIS — K57.92 DIVERTICULITIS OF INTESTINE, PART UNSPECIFIED, WITHOUT PERFORATION OR ABSCESS WITHOUT BLEEDING: ICD-10-CM

## 2024-07-18 DIAGNOSIS — Z87.19 PERSONAL HISTORY OF OTHER DISEASES OF THE DIGESTIVE SYSTEM: ICD-10-CM

## 2024-07-18 LAB
ALBUMIN SERPL ELPH-MCNC: 4.4 G/DL — SIGNIFICANT CHANGE UP (ref 3.5–5.2)
ALP SERPL-CCNC: 65 U/L — SIGNIFICANT CHANGE UP (ref 30–115)
ALT FLD-CCNC: 18 U/L — SIGNIFICANT CHANGE UP (ref 0–41)
ANION GAP SERPL CALC-SCNC: 13 MMOL/L — SIGNIFICANT CHANGE UP (ref 7–14)
AST SERPL-CCNC: 19 U/L — SIGNIFICANT CHANGE UP (ref 0–41)
BASOPHILS # BLD AUTO: 0.07 K/UL — SIGNIFICANT CHANGE UP (ref 0–0.2)
BASOPHILS NFR BLD AUTO: 1.1 % — HIGH (ref 0–1)
BILIRUB SERPL-MCNC: 0.3 MG/DL — SIGNIFICANT CHANGE UP (ref 0.2–1.2)
BUN SERPL-MCNC: 9 MG/DL — LOW (ref 10–20)
CALCIUM SERPL-MCNC: 9.4 MG/DL — SIGNIFICANT CHANGE UP (ref 8.4–10.5)
CHLORIDE SERPL-SCNC: 104 MMOL/L — SIGNIFICANT CHANGE UP (ref 98–110)
CO2 SERPL-SCNC: 23 MMOL/L — SIGNIFICANT CHANGE UP (ref 17–32)
CREAT SERPL-MCNC: 0.7 MG/DL — SIGNIFICANT CHANGE UP (ref 0.7–1.5)
EGFR: 105 ML/MIN/1.73M2 — SIGNIFICANT CHANGE UP
EOSINOPHIL # BLD AUTO: 0.2 K/UL — SIGNIFICANT CHANGE UP (ref 0–0.7)
EOSINOPHIL NFR BLD AUTO: 3.1 % — SIGNIFICANT CHANGE UP (ref 0–8)
GLUCOSE SERPL-MCNC: 94 MG/DL — SIGNIFICANT CHANGE UP (ref 70–99)
HCG SERPL QL: NEGATIVE — SIGNIFICANT CHANGE UP
HCT VFR BLD CALC: 43.7 % — SIGNIFICANT CHANGE UP (ref 37–47)
HGB BLD-MCNC: 14.6 G/DL — SIGNIFICANT CHANGE UP (ref 12–16)
IMM GRANULOCYTES NFR BLD AUTO: 0.2 % — SIGNIFICANT CHANGE UP (ref 0.1–0.3)
LACTATE SERPL-SCNC: 1.4 MMOL/L — SIGNIFICANT CHANGE UP (ref 0.7–2)
LIDOCAIN IGE QN: 26 U/L — SIGNIFICANT CHANGE UP (ref 7–60)
LYMPHOCYTES # BLD AUTO: 2.52 K/UL — SIGNIFICANT CHANGE UP (ref 1.2–3.4)
LYMPHOCYTES # BLD AUTO: 39.2 % — SIGNIFICANT CHANGE UP (ref 20.5–51.1)
MCHC RBC-ENTMCNC: 28.2 PG — SIGNIFICANT CHANGE UP (ref 27–31)
MCHC RBC-ENTMCNC: 33.4 G/DL — SIGNIFICANT CHANGE UP (ref 32–37)
MCV RBC AUTO: 84.5 FL — SIGNIFICANT CHANGE UP (ref 81–99)
MONOCYTES # BLD AUTO: 0.49 K/UL — SIGNIFICANT CHANGE UP (ref 0.1–0.6)
MONOCYTES NFR BLD AUTO: 7.6 % — SIGNIFICANT CHANGE UP (ref 1.7–9.3)
NEUTROPHILS # BLD AUTO: 3.14 K/UL — SIGNIFICANT CHANGE UP (ref 1.4–6.5)
NEUTROPHILS NFR BLD AUTO: 48.8 % — SIGNIFICANT CHANGE UP (ref 42.2–75.2)
NRBC # BLD: 0 /100 WBCS — SIGNIFICANT CHANGE UP (ref 0–0)
PLATELET # BLD AUTO: 235 K/UL — SIGNIFICANT CHANGE UP (ref 130–400)
PMV BLD: 11.1 FL — HIGH (ref 7.4–10.4)
POTASSIUM SERPL-MCNC: 4.7 MMOL/L — SIGNIFICANT CHANGE UP (ref 3.5–5)
POTASSIUM SERPL-SCNC: 4.7 MMOL/L — SIGNIFICANT CHANGE UP (ref 3.5–5)
PROT SERPL-MCNC: 7.2 G/DL — SIGNIFICANT CHANGE UP (ref 6–8)
RBC # BLD: 5.17 M/UL — SIGNIFICANT CHANGE UP (ref 4.2–5.4)
RBC # FLD: 13.2 % — SIGNIFICANT CHANGE UP (ref 11.5–14.5)
SODIUM SERPL-SCNC: 140 MMOL/L — SIGNIFICANT CHANGE UP (ref 135–146)
WBC # BLD: 6.43 K/UL — SIGNIFICANT CHANGE UP (ref 4.8–10.8)
WBC # FLD AUTO: 6.43 K/UL — SIGNIFICANT CHANGE UP (ref 4.8–10.8)

## 2024-07-18 PROCEDURE — 36000 PLACE NEEDLE IN VEIN: CPT | Mod: XU

## 2024-07-18 PROCEDURE — 84703 CHORIONIC GONADOTROPIN ASSAY: CPT

## 2024-07-18 PROCEDURE — 99285 EMERGENCY DEPT VISIT HI MDM: CPT | Mod: FS

## 2024-07-18 PROCEDURE — 83605 ASSAY OF LACTIC ACID: CPT

## 2024-07-18 PROCEDURE — 74177 CT ABD & PELVIS W/CONTRAST: CPT | Mod: 26,MC

## 2024-07-18 PROCEDURE — 99284 EMERGENCY DEPT VISIT MOD MDM: CPT | Mod: 25

## 2024-07-18 PROCEDURE — 74177 CT ABD & PELVIS W/CONTRAST: CPT | Mod: MC

## 2024-07-18 PROCEDURE — 85025 COMPLETE CBC W/AUTO DIFF WBC: CPT

## 2024-07-18 PROCEDURE — 80053 COMPREHEN METABOLIC PANEL: CPT

## 2024-07-18 PROCEDURE — 36415 COLL VENOUS BLD VENIPUNCTURE: CPT

## 2024-07-18 PROCEDURE — 83690 ASSAY OF LIPASE: CPT

## 2024-09-20 PROBLEM — K57.92 DIVERTICULITIS OF INTESTINE, PART UNSPECIFIED, WITHOUT PERFORATION OR ABSCESS WITHOUT BLEEDING: Chronic | Status: ACTIVE | Noted: 2024-07-18

## 2024-09-20 PROBLEM — G47.00 INSOMNIA, UNSPECIFIED: Chronic | Status: ACTIVE | Noted: 2024-07-18

## 2024-10-04 ENCOUNTER — APPOINTMENT (OUTPATIENT)
Dept: PAIN MANAGEMENT | Facility: CLINIC | Age: 49
End: 2024-10-04

## 2025-02-01 ENCOUNTER — EMERGENCY (EMERGENCY)
Facility: HOSPITAL | Age: 50
LOS: 0 days | Discharge: HOME | End: 2025-02-01
Attending: EMERGENCY MEDICINE
Payer: MEDICARE

## 2025-02-01 VITALS
WEIGHT: 192.9 LBS | TEMPERATURE: 98 F | HEART RATE: 77 BPM | SYSTOLIC BLOOD PRESSURE: 126 MMHG | RESPIRATION RATE: 18 BRPM | HEIGHT: 65 IN | DIASTOLIC BLOOD PRESSURE: 78 MMHG | OXYGEN SATURATION: 98 %

## 2025-02-01 VITALS
DIASTOLIC BLOOD PRESSURE: 77 MMHG | SYSTOLIC BLOOD PRESSURE: 124 MMHG | HEART RATE: 65 BPM | OXYGEN SATURATION: 99 % | TEMPERATURE: 98 F | RESPIRATION RATE: 18 BRPM

## 2025-02-01 DIAGNOSIS — R35.0 FREQUENCY OF MICTURITION: ICD-10-CM

## 2025-02-01 DIAGNOSIS — R11.0 NAUSEA: ICD-10-CM

## 2025-02-01 DIAGNOSIS — N83.8 OTHER NONINFLAMMATORY DISORDERS OF OVARY, FALLOPIAN TUBE AND BROAD LIGAMENT: ICD-10-CM

## 2025-02-01 DIAGNOSIS — F31.9 BIPOLAR DISORDER, UNSPECIFIED: ICD-10-CM

## 2025-02-01 DIAGNOSIS — R10.30 LOWER ABDOMINAL PAIN, UNSPECIFIED: ICD-10-CM

## 2025-02-01 DIAGNOSIS — Z90.49 ACQUIRED ABSENCE OF OTHER SPECIFIED PARTS OF DIGESTIVE TRACT: Chronic | ICD-10-CM

## 2025-02-01 LAB
ALBUMIN SERPL ELPH-MCNC: 4.4 G/DL — SIGNIFICANT CHANGE UP (ref 3.5–5.2)
ALP SERPL-CCNC: 75 U/L — SIGNIFICANT CHANGE UP (ref 30–115)
ALT FLD-CCNC: 12 U/L — SIGNIFICANT CHANGE UP (ref 0–41)
ANION GAP SERPL CALC-SCNC: 13 MMOL/L — SIGNIFICANT CHANGE UP (ref 7–14)
APPEARANCE UR: CLEAR — SIGNIFICANT CHANGE UP
AST SERPL-CCNC: 12 U/L — SIGNIFICANT CHANGE UP (ref 0–41)
BASOPHILS # BLD AUTO: 0.07 K/UL — SIGNIFICANT CHANGE UP (ref 0–0.2)
BASOPHILS NFR BLD AUTO: 0.8 % — SIGNIFICANT CHANGE UP (ref 0–1)
BILIRUB DIRECT SERPL-MCNC: <0.2 MG/DL — SIGNIFICANT CHANGE UP (ref 0–0.3)
BILIRUB INDIRECT FLD-MCNC: >0 MG/DL — LOW (ref 0.2–1.2)
BILIRUB SERPL-MCNC: 0.2 MG/DL — SIGNIFICANT CHANGE UP (ref 0.2–1.2)
BILIRUB UR-MCNC: NEGATIVE — SIGNIFICANT CHANGE UP
BUN SERPL-MCNC: 15 MG/DL — SIGNIFICANT CHANGE UP (ref 10–20)
CALCIUM SERPL-MCNC: 10.1 MG/DL — SIGNIFICANT CHANGE UP (ref 8.4–10.5)
CHLORIDE SERPL-SCNC: 102 MMOL/L — SIGNIFICANT CHANGE UP (ref 98–110)
CO2 SERPL-SCNC: 24 MMOL/L — SIGNIFICANT CHANGE UP (ref 17–32)
COLOR SPEC: YELLOW — SIGNIFICANT CHANGE UP
CREAT SERPL-MCNC: 0.6 MG/DL — LOW (ref 0.7–1.5)
DIFF PNL FLD: NEGATIVE — SIGNIFICANT CHANGE UP
EGFR: 109 ML/MIN/1.73M2 — SIGNIFICANT CHANGE UP
EGFR: 109 ML/MIN/1.73M2 — SIGNIFICANT CHANGE UP
EOSINOPHIL # BLD AUTO: 0.19 K/UL — SIGNIFICANT CHANGE UP (ref 0–0.7)
EOSINOPHIL NFR BLD AUTO: 2.3 % — SIGNIFICANT CHANGE UP (ref 0–8)
GLUCOSE SERPL-MCNC: 94 MG/DL — SIGNIFICANT CHANGE UP (ref 70–99)
GLUCOSE UR QL: NEGATIVE MG/DL — SIGNIFICANT CHANGE UP
HCG SERPL QL: NEGATIVE — SIGNIFICANT CHANGE UP
HCT VFR BLD CALC: 45 % — SIGNIFICANT CHANGE UP (ref 37–47)
HGB BLD-MCNC: 14.8 G/DL — SIGNIFICANT CHANGE UP (ref 12–16)
IMM GRANULOCYTES NFR BLD AUTO: 0.4 % — HIGH (ref 0.1–0.3)
KETONES UR-MCNC: NEGATIVE MG/DL — SIGNIFICANT CHANGE UP
LACTATE SERPL-SCNC: 1.8 MMOL/L — SIGNIFICANT CHANGE UP (ref 0.7–2)
LEUKOCYTE ESTERASE UR-ACNC: NEGATIVE — SIGNIFICANT CHANGE UP
LIDOCAIN IGE QN: 29 U/L — SIGNIFICANT CHANGE UP (ref 7–60)
LYMPHOCYTES # BLD AUTO: 3.1 K/UL — SIGNIFICANT CHANGE UP (ref 1.2–3.4)
LYMPHOCYTES # BLD AUTO: 37.5 % — SIGNIFICANT CHANGE UP (ref 20.5–51.1)
MCHC RBC-ENTMCNC: 27.8 PG — SIGNIFICANT CHANGE UP (ref 27–31)
MCHC RBC-ENTMCNC: 32.9 G/DL — SIGNIFICANT CHANGE UP (ref 32–37)
MCV RBC AUTO: 84.6 FL — SIGNIFICANT CHANGE UP (ref 81–99)
MONOCYTES # BLD AUTO: 0.56 K/UL — SIGNIFICANT CHANGE UP (ref 0.1–0.6)
MONOCYTES NFR BLD AUTO: 6.8 % — SIGNIFICANT CHANGE UP (ref 1.7–9.3)
NEUTROPHILS # BLD AUTO: 4.31 K/UL — SIGNIFICANT CHANGE UP (ref 1.4–6.5)
NEUTROPHILS NFR BLD AUTO: 52.2 % — SIGNIFICANT CHANGE UP (ref 42.2–75.2)
NITRITE UR-MCNC: NEGATIVE — SIGNIFICANT CHANGE UP
NRBC # BLD: 0 /100 WBCS — SIGNIFICANT CHANGE UP (ref 0–0)
NRBC BLD-RTO: 0 /100 WBCS — SIGNIFICANT CHANGE UP (ref 0–0)
PH UR: 6.5 — SIGNIFICANT CHANGE UP (ref 5–8)
PLATELET # BLD AUTO: 257 K/UL — SIGNIFICANT CHANGE UP (ref 130–400)
PMV BLD: 11.5 FL — HIGH (ref 7.4–10.4)
POTASSIUM SERPL-MCNC: 4.6 MMOL/L — SIGNIFICANT CHANGE UP (ref 3.5–5)
POTASSIUM SERPL-SCNC: 4.6 MMOL/L — SIGNIFICANT CHANGE UP (ref 3.5–5)
PROT SERPL-MCNC: 7.4 G/DL — SIGNIFICANT CHANGE UP (ref 6–8)
PROT UR-MCNC: NEGATIVE MG/DL — SIGNIFICANT CHANGE UP
RBC # BLD: 5.32 M/UL — SIGNIFICANT CHANGE UP (ref 4.2–5.4)
RBC # FLD: 14.1 % — SIGNIFICANT CHANGE UP (ref 11.5–14.5)
SODIUM SERPL-SCNC: 139 MMOL/L — SIGNIFICANT CHANGE UP (ref 135–146)
SP GR SPEC: 1.01 — SIGNIFICANT CHANGE UP (ref 1–1.03)
UROBILINOGEN FLD QL: 0.2 MG/DL — SIGNIFICANT CHANGE UP (ref 0.2–1)
WBC # BLD: 8.26 K/UL — SIGNIFICANT CHANGE UP (ref 4.8–10.8)
WBC # FLD AUTO: 8.26 K/UL — SIGNIFICANT CHANGE UP (ref 4.8–10.8)

## 2025-02-01 PROCEDURE — 96374 THER/PROPH/DIAG INJ IV PUSH: CPT | Mod: XU

## 2025-02-01 PROCEDURE — 84703 CHORIONIC GONADOTROPIN ASSAY: CPT

## 2025-02-01 PROCEDURE — 76830 TRANSVAGINAL US NON-OB: CPT

## 2025-02-01 PROCEDURE — 83690 ASSAY OF LIPASE: CPT

## 2025-02-01 PROCEDURE — 85025 COMPLETE CBC W/AUTO DIFF WBC: CPT

## 2025-02-01 PROCEDURE — 87086 URINE CULTURE/COLONY COUNT: CPT

## 2025-02-01 PROCEDURE — 81003 URINALYSIS AUTO W/O SCOPE: CPT

## 2025-02-01 PROCEDURE — 74177 CT ABD & PELVIS W/CONTRAST: CPT

## 2025-02-01 PROCEDURE — 80048 BASIC METABOLIC PNL TOTAL CA: CPT

## 2025-02-01 PROCEDURE — 99285 EMERGENCY DEPT VISIT HI MDM: CPT | Mod: FS

## 2025-02-01 PROCEDURE — 36415 COLL VENOUS BLD VENIPUNCTURE: CPT

## 2025-02-01 PROCEDURE — 99284 EMERGENCY DEPT VISIT MOD MDM: CPT | Mod: 25

## 2025-02-01 PROCEDURE — 80076 HEPATIC FUNCTION PANEL: CPT

## 2025-02-01 PROCEDURE — 74177 CT ABD & PELVIS W/CONTRAST: CPT | Mod: 26

## 2025-02-01 PROCEDURE — 83605 ASSAY OF LACTIC ACID: CPT

## 2025-02-01 PROCEDURE — 76830 TRANSVAGINAL US NON-OB: CPT | Mod: 26

## 2025-02-01 RX ORDER — ONDANSETRON HCL/PF 4 MG/2 ML
4 VIAL (ML) INJECTION ONCE
Refills: 0 | Status: DISCONTINUED | OUTPATIENT
Start: 2025-02-01 | End: 2025-02-01

## 2025-02-01 RX ORDER — ACETAMINOPHEN 500 MG/5ML
975 LIQUID (ML) ORAL ONCE
Refills: 0 | Status: COMPLETED | OUTPATIENT
Start: 2025-02-01 | End: 2025-02-01

## 2025-02-01 RX ADMIN — Medication 1000 MILLILITER(S): at 11:33

## 2025-02-01 RX ADMIN — Medication 20 MILLIGRAM(S): at 11:33

## 2025-02-01 RX ADMIN — Medication 975 MILLIGRAM(S): at 11:53

## 2025-02-03 LAB
CULTURE RESULTS: NO GROWTH — SIGNIFICANT CHANGE UP
SPECIMEN SOURCE: SIGNIFICANT CHANGE UP

## 2025-02-26 ENCOUNTER — APPOINTMENT (OUTPATIENT)
Age: 50
End: 2025-02-26

## 2025-04-19 ENCOUNTER — EMERGENCY (EMERGENCY)
Facility: HOSPITAL | Age: 50
LOS: 0 days | Discharge: HOME | End: 2025-04-19
Attending: STUDENT IN AN ORGANIZED HEALTH CARE EDUCATION/TRAINING PROGRAM
Payer: MEDICARE

## 2025-04-19 VITALS
DIASTOLIC BLOOD PRESSURE: 74 MMHG | SYSTOLIC BLOOD PRESSURE: 118 MMHG | RESPIRATION RATE: 17 BRPM | HEART RATE: 82 BPM | TEMPERATURE: 98 F | WEIGHT: 199.96 LBS | OXYGEN SATURATION: 100 %

## 2025-04-19 DIAGNOSIS — B34.9 VIRAL INFECTION, UNSPECIFIED: ICD-10-CM

## 2025-04-19 DIAGNOSIS — R68.83 CHILLS (WITHOUT FEVER): ICD-10-CM

## 2025-04-19 DIAGNOSIS — F31.9 BIPOLAR DISORDER, UNSPECIFIED: ICD-10-CM

## 2025-04-19 DIAGNOSIS — Z90.49 ACQUIRED ABSENCE OF OTHER SPECIFIED PARTS OF DIGESTIVE TRACT: Chronic | ICD-10-CM

## 2025-04-19 DIAGNOSIS — R53.1 WEAKNESS: ICD-10-CM

## 2025-04-19 DIAGNOSIS — R07.0 PAIN IN THROAT: ICD-10-CM

## 2025-04-19 LAB
ALBUMIN SERPL ELPH-MCNC: 4.4 G/DL — SIGNIFICANT CHANGE UP (ref 3.5–5.2)
ALP SERPL-CCNC: 96 U/L — SIGNIFICANT CHANGE UP (ref 30–115)
ALT FLD-CCNC: 15 U/L — SIGNIFICANT CHANGE UP (ref 0–41)
ANION GAP SERPL CALC-SCNC: 16 MMOL/L — HIGH (ref 7–14)
AST SERPL-CCNC: 17 U/L — SIGNIFICANT CHANGE UP (ref 0–41)
BASOPHILS # BLD AUTO: 0.06 K/UL — SIGNIFICANT CHANGE UP (ref 0–0.2)
BASOPHILS NFR BLD AUTO: 0.7 % — SIGNIFICANT CHANGE UP (ref 0–1)
BILIRUB SERPL-MCNC: 0.3 MG/DL — SIGNIFICANT CHANGE UP (ref 0.2–1.2)
BUN SERPL-MCNC: 7 MG/DL — LOW (ref 10–20)
CALCIUM SERPL-MCNC: 10 MG/DL — SIGNIFICANT CHANGE UP (ref 8.4–10.5)
CHLORIDE SERPL-SCNC: 99 MMOL/L — SIGNIFICANT CHANGE UP (ref 98–110)
CO2 SERPL-SCNC: 23 MMOL/L — SIGNIFICANT CHANGE UP (ref 17–32)
CREAT SERPL-MCNC: 0.6 MG/DL — LOW (ref 0.7–1.5)
EGFR: 109 ML/MIN/1.73M2 — SIGNIFICANT CHANGE UP
EGFR: 109 ML/MIN/1.73M2 — SIGNIFICANT CHANGE UP
EOSINOPHIL # BLD AUTO: 0.14 K/UL — SIGNIFICANT CHANGE UP (ref 0–0.7)
EOSINOPHIL NFR BLD AUTO: 1.6 % — SIGNIFICANT CHANGE UP (ref 0–8)
FLUAV AG NPH QL: SIGNIFICANT CHANGE UP
FLUBV AG NPH QL: SIGNIFICANT CHANGE UP
GLUCOSE SERPL-MCNC: 112 MG/DL — HIGH (ref 70–99)
HCT VFR BLD CALC: 47.4 % — HIGH (ref 37–47)
HGB BLD-MCNC: 16.2 G/DL — HIGH (ref 12–16)
IMM GRANULOCYTES NFR BLD AUTO: 0.2 % — SIGNIFICANT CHANGE UP (ref 0.1–0.3)
LYMPHOCYTES # BLD AUTO: 3.06 K/UL — SIGNIFICANT CHANGE UP (ref 1.2–3.4)
LYMPHOCYTES # BLD AUTO: 36 % — SIGNIFICANT CHANGE UP (ref 20.5–51.1)
MCHC RBC-ENTMCNC: 27.8 PG — SIGNIFICANT CHANGE UP (ref 27–31)
MCHC RBC-ENTMCNC: 34.2 G/DL — SIGNIFICANT CHANGE UP (ref 32–37)
MCV RBC AUTO: 81.4 FL — SIGNIFICANT CHANGE UP (ref 81–99)
MONOCYTES # BLD AUTO: 0.43 K/UL — SIGNIFICANT CHANGE UP (ref 0.1–0.6)
MONOCYTES NFR BLD AUTO: 5.1 % — SIGNIFICANT CHANGE UP (ref 1.7–9.3)
NEUTROPHILS # BLD AUTO: 4.78 K/UL — SIGNIFICANT CHANGE UP (ref 1.4–6.5)
NEUTROPHILS NFR BLD AUTO: 56.4 % — SIGNIFICANT CHANGE UP (ref 42.2–75.2)
NRBC BLD AUTO-RTO: 0 /100 WBCS — SIGNIFICANT CHANGE UP (ref 0–0)
PLATELET # BLD AUTO: 293 K/UL — SIGNIFICANT CHANGE UP (ref 130–400)
PMV BLD: 11.1 FL — HIGH (ref 7.4–10.4)
POTASSIUM SERPL-MCNC: 3.8 MMOL/L — SIGNIFICANT CHANGE UP (ref 3.5–5)
POTASSIUM SERPL-SCNC: 3.8 MMOL/L — SIGNIFICANT CHANGE UP (ref 3.5–5)
PROT SERPL-MCNC: 7.8 G/DL — SIGNIFICANT CHANGE UP (ref 6–8)
RBC # BLD: 5.82 M/UL — HIGH (ref 4.2–5.4)
RBC # FLD: 13 % — SIGNIFICANT CHANGE UP (ref 11.5–14.5)
RSV RNA NPH QL NAA+NON-PROBE: SIGNIFICANT CHANGE UP
SARS-COV-2 RNA SPEC QL NAA+PROBE: SIGNIFICANT CHANGE UP
SODIUM SERPL-SCNC: 138 MMOL/L — SIGNIFICANT CHANGE UP (ref 135–146)
SOURCE RESPIRATORY: SIGNIFICANT CHANGE UP
WBC # BLD: 8.49 K/UL — SIGNIFICANT CHANGE UP (ref 4.8–10.8)
WBC # FLD AUTO: 8.49 K/UL — SIGNIFICANT CHANGE UP (ref 4.8–10.8)

## 2025-04-19 PROCEDURE — 85025 COMPLETE CBC W/AUTO DIFF WBC: CPT

## 2025-04-19 PROCEDURE — 99284 EMERGENCY DEPT VISIT MOD MDM: CPT | Mod: 25

## 2025-04-19 PROCEDURE — 0241U: CPT

## 2025-04-19 PROCEDURE — 96374 THER/PROPH/DIAG INJ IV PUSH: CPT

## 2025-04-19 PROCEDURE — 36415 COLL VENOUS BLD VENIPUNCTURE: CPT

## 2025-04-19 PROCEDURE — 93005 ELECTROCARDIOGRAM TRACING: CPT

## 2025-04-19 PROCEDURE — 99284 EMERGENCY DEPT VISIT MOD MDM: CPT | Mod: FS

## 2025-04-19 PROCEDURE — 93010 ELECTROCARDIOGRAM REPORT: CPT

## 2025-04-19 PROCEDURE — 80053 COMPREHEN METABOLIC PANEL: CPT

## 2025-04-19 RX ORDER — SODIUM CHLORIDE 9 G/1000ML
1000 INJECTION, SOLUTION INTRAVENOUS ONCE
Refills: 0 | Status: COMPLETED | OUTPATIENT
Start: 2025-04-19 | End: 2025-04-19

## 2025-04-19 RX ORDER — ACETAMINOPHEN 500 MG/5ML
975 LIQUID (ML) ORAL ONCE
Refills: 0 | Status: COMPLETED | OUTPATIENT
Start: 2025-04-19 | End: 2025-04-19

## 2025-04-19 RX ORDER — KETOROLAC TROMETHAMINE 30 MG/ML
15 INJECTION, SOLUTION INTRAMUSCULAR; INTRAVENOUS ONCE
Refills: 0 | Status: DISCONTINUED | OUTPATIENT
Start: 2025-04-19 | End: 2025-04-19

## 2025-04-19 RX ADMIN — Medication 975 MILLIGRAM(S): at 16:13

## 2025-04-19 RX ADMIN — KETOROLAC TROMETHAMINE 15 MILLIGRAM(S): 30 INJECTION, SOLUTION INTRAMUSCULAR; INTRAVENOUS at 16:05

## 2025-06-05 NOTE — ED ADULT NURSE NOTE - NS ED NURSE DISCH DISPOSITION
Where Is Your Skin Lesion Of Concern Located?: Face and hands Additional Comments (Use Complete Sentences): Established patient \\nPatient states he has lesions on his hands and face he would like evaluated Patient declines\\nLast FSE 2/2025\\nLast seen 4/2025 for AKs and dermatitis \\nJDL patient Discharged